# Patient Record
Sex: MALE | Race: WHITE | NOT HISPANIC OR LATINO | Employment: FULL TIME | URBAN - METROPOLITAN AREA
[De-identification: names, ages, dates, MRNs, and addresses within clinical notes are randomized per-mention and may not be internally consistent; named-entity substitution may affect disease eponyms.]

---

## 2023-02-13 PROBLEM — K46.9 ABDOMINAL HERNIA: Status: ACTIVE | Noted: 2023-02-13

## 2023-02-13 PROBLEM — E66.9 DIABETES MELLITUS TYPE 2 IN OBESE: Status: ACTIVE | Noted: 2023-02-13

## 2023-02-13 PROBLEM — R03.0 WHITE COAT SYNDROME WITHOUT HYPERTENSION: Status: ACTIVE | Noted: 2023-02-13

## 2023-02-13 PROBLEM — M25.569 KNEE PAIN: Status: ACTIVE | Noted: 2023-02-13

## 2023-02-13 PROBLEM — Z22.321 MSSA (METHICILLIN-SUSCEPTIBLE STAPH AUREUS) CARRIER: Status: ACTIVE | Noted: 2023-02-13

## 2023-02-13 PROBLEM — E78.5 HLD (HYPERLIPIDEMIA): Status: ACTIVE | Noted: 2023-02-13

## 2023-02-13 PROBLEM — E11.69 DIABETES MELLITUS TYPE 2 IN OBESE: Status: ACTIVE | Noted: 2023-02-13

## 2023-02-13 PROBLEM — K40.90 LEFT INGUINAL HERNIA: Status: ACTIVE | Noted: 2023-02-13

## 2023-03-07 ENCOUNTER — TELEPHONE (OUTPATIENT)
Dept: PRIMARY CARE | Facility: CLINIC | Age: 73
End: 2023-03-07
Payer: MEDICARE

## 2023-03-07 DIAGNOSIS — E78.5 HYPERLIPIDEMIA, UNSPECIFIED HYPERLIPIDEMIA TYPE: ICD-10-CM

## 2023-03-07 DIAGNOSIS — E66.9 DIABETES MELLITUS TYPE 2 IN OBESE: Primary | ICD-10-CM

## 2023-03-07 DIAGNOSIS — E11.69 DIABETES MELLITUS TYPE 2 IN OBESE: Primary | ICD-10-CM

## 2023-03-18 ENCOUNTER — TELEPHONE (OUTPATIENT)
Dept: PRIMARY CARE | Facility: CLINIC | Age: 73
End: 2023-03-18
Payer: MEDICARE

## 2023-03-18 DIAGNOSIS — E66.9 DIABETES MELLITUS TYPE 2 IN OBESE: Primary | ICD-10-CM

## 2023-03-18 DIAGNOSIS — E78.49 OTHER HYPERLIPIDEMIA: ICD-10-CM

## 2023-03-18 DIAGNOSIS — E11.69 DIABETES MELLITUS TYPE 2 IN OBESE: Primary | ICD-10-CM

## 2023-03-18 RX ORDER — GLIPIZIDE 5 MG/1
5 TABLET ORAL
Qty: 90 TABLET | Refills: 3 | Status: SHIPPED | OUTPATIENT
Start: 2023-03-18 | End: 2023-04-03 | Stop reason: SDUPTHER

## 2023-03-18 NOTE — TELEPHONE ENCOUNTER
----- Message from Andres Forde MA sent at 3/17/2023 11:24 AM EDT -----  Regarding: FW: Med Refill    ----- Message -----  From: Osmar Garcia  Sent: 3/17/2023  11:09 AM EDT  To: Do Koehler [address] [place] [address] Clinical Support Staff  Subject: Med Refill                                       Patient called to request a refill on Glipizide 5mg sent to pharmacy on file.

## 2023-03-20 ENCOUNTER — LAB (OUTPATIENT)
Dept: LAB | Facility: LAB | Age: 73
End: 2023-03-20
Payer: COMMERCIAL

## 2023-03-20 DIAGNOSIS — E78.49 OTHER HYPERLIPIDEMIA: ICD-10-CM

## 2023-03-20 DIAGNOSIS — E66.9 DIABETES MELLITUS TYPE 2 IN OBESE: ICD-10-CM

## 2023-03-20 DIAGNOSIS — E11.69 DIABETES MELLITUS TYPE 2 IN OBESE: ICD-10-CM

## 2023-03-20 LAB
ALANINE AMINOTRANSFERASE (SGPT) (U/L) IN SER/PLAS: 50 U/L (ref 10–52)
ALBUMIN (G/DL) IN SER/PLAS: 4.4 G/DL (ref 3.4–5)
ALKALINE PHOSPHATASE (U/L) IN SER/PLAS: 74 U/L (ref 33–136)
ANION GAP IN SER/PLAS: 15 MMOL/L (ref 10–20)
ASPARTATE AMINOTRANSFERASE (SGOT) (U/L) IN SER/PLAS: 25 U/L (ref 9–39)
BILIRUBIN TOTAL (MG/DL) IN SER/PLAS: 1.1 MG/DL (ref 0–1.2)
CALCIUM (MG/DL) IN SER/PLAS: 9.7 MG/DL (ref 8.6–10.6)
CARBON DIOXIDE, TOTAL (MMOL/L) IN SER/PLAS: 21 MMOL/L (ref 21–32)
CHLORIDE (MMOL/L) IN SER/PLAS: 104 MMOL/L (ref 98–107)
CHOLESTEROL (MG/DL) IN SER/PLAS: 113 MG/DL (ref 0–199)
CHOLESTEROL IN HDL (MG/DL) IN SER/PLAS: 29.1 MG/DL
CHOLESTEROL/HDL RATIO: 3.9
CREATININE (MG/DL) IN SER/PLAS: 0.83 MG/DL (ref 0.5–1.3)
ESTIMATED AVERAGE GLUCOSE FOR HBA1C: 194 MG/DL
GFR MALE: >90 ML/MIN/1.73M2
GLUCOSE (MG/DL) IN SER/PLAS: 254 MG/DL (ref 74–99)
HEMOGLOBIN A1C/HEMOGLOBIN TOTAL IN BLOOD: 8.4 %
LDL: 37 MG/DL (ref 0–99)
NON HDL CHOLESTEROL: 84 MG/DL
POTASSIUM (MMOL/L) IN SER/PLAS: 4.4 MMOL/L (ref 3.5–5.3)
PROTEIN TOTAL: 6.9 G/DL (ref 6.4–8.2)
SODIUM (MMOL/L) IN SER/PLAS: 136 MMOL/L (ref 136–145)
TRIGLYCERIDE (MG/DL) IN SER/PLAS: 235 MG/DL (ref 0–149)
UREA NITROGEN (MG/DL) IN SER/PLAS: 17 MG/DL (ref 6–23)
VLDL: 47 MG/DL (ref 0–40)

## 2023-03-20 PROCEDURE — 80053 COMPREHEN METABOLIC PANEL: CPT

## 2023-03-20 PROCEDURE — 80061 LIPID PANEL: CPT

## 2023-03-20 PROCEDURE — 83036 HEMOGLOBIN GLYCOSYLATED A1C: CPT

## 2023-03-20 PROCEDURE — 36415 COLL VENOUS BLD VENIPUNCTURE: CPT

## 2023-04-03 ENCOUNTER — OFFICE VISIT (OUTPATIENT)
Dept: PRIMARY CARE | Facility: CLINIC | Age: 73
End: 2023-04-03
Payer: COMMERCIAL

## 2023-04-03 VITALS
BODY MASS INDEX: 32.51 KG/M2 | HEART RATE: 102 BPM | WEIGHT: 232.2 LBS | DIASTOLIC BLOOD PRESSURE: 90 MMHG | TEMPERATURE: 97.1 F | SYSTOLIC BLOOD PRESSURE: 146 MMHG | HEIGHT: 71 IN | OXYGEN SATURATION: 94 %

## 2023-04-03 DIAGNOSIS — R03.0 WHITE COAT SYNDROME WITHOUT HYPERTENSION: ICD-10-CM

## 2023-04-03 DIAGNOSIS — E11.69 DIABETES MELLITUS TYPE 2 IN OBESE: Primary | ICD-10-CM

## 2023-04-03 DIAGNOSIS — K43.9 HERNIA OF ABDOMINAL WALL: Chronic | ICD-10-CM

## 2023-04-03 DIAGNOSIS — E66.9 DIABETES MELLITUS TYPE 2 IN OBESE: Primary | ICD-10-CM

## 2023-04-03 DIAGNOSIS — E78.2 MIXED HYPERLIPIDEMIA: ICD-10-CM

## 2023-04-03 DIAGNOSIS — Z23 NEED FOR SHINGLES VACCINE: ICD-10-CM

## 2023-04-03 LAB
ALBUMIN (MG/L) IN URINE: 7.3 MG/L
ALBUMIN/CREATININE (UG/MG) IN URINE: 5.6 UG/MG CRT (ref 0–30)
CREATININE (MG/DL) IN URINE: 130 MG/DL (ref 20–370)

## 2023-04-03 PROCEDURE — 99214 OFFICE O/P EST MOD 30 MIN: CPT | Performed by: FAMILY MEDICINE

## 2023-04-03 PROCEDURE — 1036F TOBACCO NON-USER: CPT | Performed by: FAMILY MEDICINE

## 2023-04-03 PROCEDURE — 1160F RVW MEDS BY RX/DR IN RCRD: CPT | Performed by: FAMILY MEDICINE

## 2023-04-03 PROCEDURE — 3077F SYST BP >= 140 MM HG: CPT | Performed by: FAMILY MEDICINE

## 2023-04-03 PROCEDURE — 3052F HG A1C>EQUAL 8.0%<EQUAL 9.0%: CPT | Performed by: FAMILY MEDICINE

## 2023-04-03 PROCEDURE — 82043 UR ALBUMIN QUANTITATIVE: CPT

## 2023-04-03 PROCEDURE — 82570 ASSAY OF URINE CREATININE: CPT

## 2023-04-03 PROCEDURE — 3080F DIAST BP >= 90 MM HG: CPT | Performed by: FAMILY MEDICINE

## 2023-04-03 PROCEDURE — 1159F MED LIST DOCD IN RCRD: CPT | Performed by: FAMILY MEDICINE

## 2023-04-03 RX ORDER — METFORMIN HYDROCHLORIDE 1000 MG/1
1000 TABLET ORAL
Qty: 180 TABLET | Refills: 3 | Status: SHIPPED | OUTPATIENT
Start: 2023-04-03 | End: 2024-04-02

## 2023-04-03 RX ORDER — ISOPROPYL ALCOHOL 70 ML/100ML
SWAB TOPICAL
COMMUNITY

## 2023-04-03 RX ORDER — GLIPIZIDE 5 MG/1
5 TABLET ORAL
Qty: 90 TABLET | Refills: 3 | Status: SHIPPED | OUTPATIENT
Start: 2023-04-03 | End: 2024-04-02

## 2023-04-03 RX ORDER — BLOOD-GLUCOSE CONTROL, NORMAL
EACH MISCELLANEOUS
COMMUNITY

## 2023-04-03 ASSESSMENT — PAIN SCALES - GENERAL: PAINLEVEL: 0-NO PAIN

## 2023-04-03 NOTE — PROGRESS NOTES
"  Subjective   Chief complaint: Jose Domingo is a 73 y.o. male who presents for Annual Exam (Pt is here for a physical.).    HPI:  History obtained from the patient.Patient is here today for Follow up DM,HTN.Patient reports that Blood sugar recording Run between [ 200-220].Occasional high recordings at [ 225].  No recent episodes of hypoglycemia.Medications have been well tolerated,No reportable side effects.Patient have been compliant with diet.  HTN: stable,tolerating meds well,No side effects,Denies chest pain,SOB,Palpitations,Headache and blurry vision.Patient also denies Leg swelling and LIMA.  Has been doing fairly well, have gained weight over the last 2-3 month after his knee surgery.  Plan to be more active during summer.      Ros:  Patient denies  Shortness of breath , chest pain  Nausea vomiting and diarrhea  No fever or chills  No dysuria  No hearing or vision changes  No skin lesions.    Objective   /90 (BP Location: Left arm, Patient Position: Sitting, BP Cuff Size: Large adult)   Pulse 102   Temp 36.2 °C (97.1 °F) (Temporal)   Ht 1.803 m (5' 11\")   Wt 105 kg (232 lb 3.2 oz)   SpO2 94%   BMI 32.39 kg/m²       General appearance: alert and oriented, in no acute distress  Eyes: conjunctivae/corneas clear. PERRL, EOM's intact.   Neck: no adenopathy, no carotid bruit, supple, symmetrical, trachea midline, and thyroid not enlarged, symmetric, no tenderness/mass/nodules  Lungs: clear to auscultation bilaterally  Chest wall: no tenderness  Heart: regular rate and rhythm, S1, S2 normal, no murmur, click, rub or gallop  Abdomen: soft, non-tender; bowel sounds normal; no masses, 2 hernias, inguinal and paraumblical.  Skin: Skin color, texture, turgor normal. No rashes or lesions  Neurologic: Motor and sensory Grossly normal        I have reviewed and reconciled the medication list with the patient today.   Current Outpatient Medications:     alcohol swabs pads, medicated, 70% pad; use as directed, " Disp: , Rfl:     atorvastatin (Lipitor) 40 mg tablet, Take 1 tablet (40 mg) by mouth once daily at bedtime., Disp: , Rfl:     blood sugar diagnostic (Advanced Gluc Meter Test Strip) strip, in the morning., Disp: , Rfl:     blood sugar diagnostic strip, Use as directed once daily and PRN, Disp: , Rfl:     blood-glucose control, normal (GLUCOSE CONTROL MISC), Use as directed, Disp: , Rfl:     glipiZIDE (Glucotrol) 5 mg tablet, Take 1 tablet (5 mg) by mouth once daily., Disp: 90 tablet, Rfl: 3    lancets 30 gauge misc, Use as directed, Disp: , Rfl:     metFORMIN (Glucophage) 1,000 mg tablet, Take 1 tablet (1,000 mg) by mouth in the morning and 1 tablet (1,000 mg) in the evening., Disp: , Rfl:     OneTouch Ultra Test strip, in the morning., Disp: , Rfl:     SITagliptin phosphate (Januvia) 50 mg tablet, Take 1 tablet (50 mg) by mouth once daily., Disp: , Rfl:     empagliflozin (Jardiance) 25 mg, Take 1 tablet (25 mg) by mouth once daily., Disp: , Rfl:      Imaging:  No results found.     Labs reviewed:    Lab Results   Component Value Date    CHOL 113 03/20/2023    TRIG 235 (H) 03/20/2023    HDL 29.1 (A) 03/20/2023    ALT 50 03/20/2023    AST 25 03/20/2023     03/20/2023    K 4.4 03/20/2023     03/20/2023    CREATININE 0.83 03/20/2023    BUN 17 03/20/2023    CO2 21 03/20/2023    HGBA1C 8.4 (A) 03/20/2023         Assessment/Plan     Diagnoses and all orders for this visit:  Diabetes mellitus type 2 in obese (CMS/HCC): needs better control  -     SITagliptin phosphate (Januvia) 50 mg tablet; Take 2 tablets (100 mg) by mouth once daily.  -     Referral to Ophthalmology; Future  -     glipiZIDE (Glucotrol) 5 mg tablet; Take 1 tablet (5 mg) by mouth once daily.  -     metFORMIN (Glucophage) 1,000 mg tablet; Take 1 tablet (1,000 mg) by mouth in the morning and 1 tablet (1,000 mg) in the evening. Take with meals.  -     Albumin , Urine Random; Future  Mixed hyperlipidemia: continue lipitor  White coat syndrome  without hypertension: mildly elevated but reports normal recordings at home.  Hernia of abdominal wall  -     Referral to General Surgery; Future  Need for shingles vaccine  -     zoster vaccine-recombinant adjuvanted (Shingrix) 50 mcg/0.5 mL vaccine; Inject 0.5 mL into the shoulder, thigh, or buttocks 1 time for 1 dose.    Diagnosis and Management discussed with the patient.  Patient agreeable with plan.  Patient advised to Return to clinic with new or unresolved symptoms.  Artemio Ayala MD    This note was partially generated using the Dragon Voice Recognition System and there may be some incorrect words or wording, spelling and /or spelling errors, or punctuation errors that were not corrected prior to committing the note to the medical record.

## 2023-06-14 ENCOUNTER — TELEPHONE (OUTPATIENT)
Dept: PRIMARY CARE | Facility: CLINIC | Age: 73
End: 2023-06-14

## 2023-06-14 DIAGNOSIS — E11.69 DIABETES MELLITUS TYPE 2 IN OBESE: Primary | ICD-10-CM

## 2023-06-14 DIAGNOSIS — E66.9 DIABETES MELLITUS TYPE 2 IN OBESE: Primary | ICD-10-CM

## 2023-06-14 NOTE — TELEPHONE ENCOUNTER
PT is calling asking for an order of blood work (full panel) to be placed before his appointment.

## 2023-06-15 DIAGNOSIS — R79.89 ABNORMAL LFTS: Primary | ICD-10-CM

## 2023-06-24 ENCOUNTER — LAB (OUTPATIENT)
Dept: LAB | Facility: LAB | Age: 73
End: 2023-06-24
Payer: MEDICARE

## 2023-06-24 DIAGNOSIS — E11.69 DIABETES MELLITUS TYPE 2 IN OBESE: ICD-10-CM

## 2023-06-24 DIAGNOSIS — R79.89 ABNORMAL LFTS: ICD-10-CM

## 2023-06-24 DIAGNOSIS — E66.9 DIABETES MELLITUS TYPE 2 IN OBESE: ICD-10-CM

## 2023-06-24 PROCEDURE — 80053 COMPREHEN METABOLIC PANEL: CPT

## 2023-06-24 PROCEDURE — 82043 UR ALBUMIN QUANTITATIVE: CPT

## 2023-06-24 PROCEDURE — 36415 COLL VENOUS BLD VENIPUNCTURE: CPT

## 2023-06-24 PROCEDURE — 83036 HEMOGLOBIN GLYCOSYLATED A1C: CPT

## 2023-06-24 PROCEDURE — 82570 ASSAY OF URINE CREATININE: CPT

## 2023-06-26 LAB
ALANINE AMINOTRANSFERASE (SGPT) (U/L) IN SER/PLAS: 74 U/L (ref 10–52)
ALBUMIN (G/DL) IN SER/PLAS: 4.7 G/DL (ref 3.4–5)
ALBUMIN (MG/L) IN URINE: <7 MG/L
ALBUMIN/CREATININE (UG/MG) IN URINE: NORMAL UG/MG CRT (ref 0–30)
ALKALINE PHOSPHATASE (U/L) IN SER/PLAS: 73 U/L (ref 33–136)
ANION GAP IN SER/PLAS: 12 MMOL/L (ref 10–20)
ASPARTATE AMINOTRANSFERASE (SGOT) (U/L) IN SER/PLAS: 34 U/L (ref 9–39)
BILIRUBIN TOTAL (MG/DL) IN SER/PLAS: 1.2 MG/DL (ref 0–1.2)
CALCIUM (MG/DL) IN SER/PLAS: 10 MG/DL (ref 8.6–10.6)
CARBON DIOXIDE, TOTAL (MMOL/L) IN SER/PLAS: 24 MMOL/L (ref 21–32)
CHLORIDE (MMOL/L) IN SER/PLAS: 106 MMOL/L (ref 98–107)
CREATININE (MG/DL) IN SER/PLAS: 0.86 MG/DL (ref 0.5–1.3)
CREATININE (MG/DL) IN URINE: 120 MG/DL (ref 20–370)
ESTIMATED AVERAGE GLUCOSE FOR HBA1C: 189 MG/DL
GFR MALE: >90 ML/MIN/1.73M2
GLUCOSE (MG/DL) IN SER/PLAS: 123 MG/DL (ref 74–99)
HEMOGLOBIN A1C/HEMOGLOBIN TOTAL IN BLOOD: 8.2 %
POTASSIUM (MMOL/L) IN SER/PLAS: 4.1 MMOL/L (ref 3.5–5.3)
PROTEIN TOTAL: 7.4 G/DL (ref 6.4–8.2)
SODIUM (MMOL/L) IN SER/PLAS: 138 MMOL/L (ref 136–145)
UREA NITROGEN (MG/DL) IN SER/PLAS: 20 MG/DL (ref 6–23)

## 2023-06-26 ASSESSMENT — ENCOUNTER SYMPTOMS
SPEECH DIFFICULTY: 0
WEIGHT LOSS: 0
NERVOUS/ANXIOUS: 0
VISUAL CHANGE: 0
CONFUSION: 0
WEAKNESS: 0
POLYDIPSIA: 0
DIZZINESS: 0
SEIZURES: 0
TREMORS: 0
POLYPHAGIA: 0
BLURRED VISION: 0
FATIGUE: 0
HUNGER: 0
BLACKOUTS: 0
HEADACHES: 0

## 2023-07-03 ENCOUNTER — TELEMEDICINE (OUTPATIENT)
Dept: PRIMARY CARE | Facility: CLINIC | Age: 73
End: 2023-07-03
Payer: MEDICARE

## 2023-07-03 VITALS — BODY MASS INDEX: 31.8 KG/M2 | WEIGHT: 228 LBS

## 2023-07-03 DIAGNOSIS — E78.2 MIXED HYPERLIPIDEMIA: ICD-10-CM

## 2023-07-03 DIAGNOSIS — K40.90 LEFT INGUINAL HERNIA: ICD-10-CM

## 2023-07-03 DIAGNOSIS — E11.69 DIABETES MELLITUS TYPE 2 IN OBESE: Primary | ICD-10-CM

## 2023-07-03 DIAGNOSIS — E66.9 DIABETES MELLITUS TYPE 2 IN OBESE: Primary | ICD-10-CM

## 2023-07-03 PROCEDURE — 99213 OFFICE O/P EST LOW 20 MIN: CPT | Performed by: FAMILY MEDICINE

## 2023-07-03 NOTE — PROGRESS NOTES
Subjective   Chief complaint: Jose Domingo is a 73 y.o. male who presents for Follow-up (From the visit).    HPI:  History obtained from the patient.Patient is here today for Follow up DM,HTN.Patient reports that Blood sugar recording Run between [ 150-200].Occasional high recordings at [ > 200].  No recent episodes of hypoglycemia.Medications have been well tolerated,No reportable side effects.Patient have been compliant with diet.  Januvia is becoming expensive and is having difficulty get it due to cost.  Hernia: would like to have the repair, no obstuction, no pain.        Ros:  Patient denies  Shortness of breath , chest pain  Nausea vomiting and diarrhea  No fever or chills  No dysuria  No hearing or vision changes  No skin lesions.    Objective   There were no vitals taken for this visit.      PHYSICAL EXAMINATION:  GENERAL: Alert,In no apparent distress  HEENT: Normocephalic, atraumatic. Extraocular movements intact.  NECK: Supple.  CHEST: Non labored breathing  EXTREMITIES: NROM  NEUROLOGIC: Motor Functions Grossly intact    I have reviewed and reconciled the medication list with the patient today.   Current Outpatient Medications:     alcohol swabs pads, medicated, 70% pad; use as directed, Disp: , Rfl:     atorvastatin (Lipitor) 40 mg tablet, TAKE 1 TABLET BY MOUTH EVERYDAY AT BEDTIME, Disp: 90 tablet, Rfl: 1    blood sugar diagnostic (Advanced Gluc Meter Test Strip) strip, in the morning., Disp: , Rfl:     blood sugar diagnostic strip, Use as directed once daily and PRN, Disp: , Rfl:     blood-glucose control, normal (GLUCOSE CONTROL MISC), Use as directed, Disp: , Rfl:     glipiZIDE (Glucotrol) 5 mg tablet, Take 1 tablet (5 mg) by mouth once daily., Disp: 90 tablet, Rfl: 3    lancets 30 gauge misc, Use as directed, Disp: , Rfl:     metFORMIN (Glucophage) 1,000 mg tablet, Take 1 tablet (1,000 mg) by mouth in the morning and 1 tablet (1,000 mg) in the evening. Take with meals., Disp: 180 tablet, Rfl: 3    " OneTouch Ultra Test strip, in the morning., Disp: , Rfl:     SITagliptin phosphate (Januvia) 100 mg tablet, Take 1 tablet (100 mg) by mouth once daily., Disp: 90 tablet, Rfl: 3     Imaging:  No results found.     Labs reviewed:    Lab Results   Component Value Date    CHOL 113 03/20/2023    TRIG 235 (H) 03/20/2023    HDL 29.1 (A) 03/20/2023    ALT 74 (H) 06/24/2023    AST 34 06/24/2023     06/24/2023    K 4.1 06/24/2023     06/24/2023    CREATININE 0.86 06/24/2023    BUN 20 06/24/2023    CO2 24 06/24/2023    HGBA1C 8.2 (A) 06/24/2023     Lab Results   Component Value Date    CHOL 113 03/20/2023    CHOL 189 03/19/2022    CHOL 132 06/15/2020     Lab Results   Component Value Date    HDL 29.1 (A) 03/20/2023    HDL 31.9 (A) 03/19/2022    HDL 35.7 (A) 06/15/2020     No results found for: \"LDLCALC\"  Lab Results   Component Value Date    TRIG 235 (H) 03/20/2023    TRIG 430 (H) 03/19/2022    TRIG 206 (H) 06/15/2020         Assessment/Plan   Diagnoses and all orders for this visit:  Diabetes mellitus type 2 in obese (CMS/[facility])  -   Tral of Jardiance again, may need to switch from januvia, will refill as well and he will let me know about copayment.  F/Up ophthalmology.    empagliflozin (Jardiance) 25 mg; Take 1 tablet (25 mg) by mouth once daily.  -     SITagliptin phosphate (Januvia) 100 mg tablet; Take 1 tablet (100 mg) by mouth once daily.  Left inguinal hernia  -     Referral to General Surgery; Future  Mixed hyperlipidemia  Continue Lipitor, lifestyle modifications discussed.    Plan to switch Glipizide as well next visit.    Diagnosis and Management discussed with the patient.  Patient agreeable with plan.  Patient advised to Return to clinic with new or unresolved symptoms.  Artemio Ayala MD    This note was partially generated using the Dragon Voice Recognition System and there may be some incorrect words or wording, spelling and /or spelling errors, or punctuation errors that were not corrected prior to " committing the note to the medical record.      Answers submitted by the patient for this visit:  Diabetes Questionnaire (Submitted on 6/26/2023)  Chief Complaint: Diabetes problem  Diabetes type: type 2  MedicAlert ID: No  Disease duration: 4 Years  blurred vision: No  chest pain: No  fatigue: No  foot paresthesias: No  foot ulcerations: No  polydipsia: No  polyphagia: No  polyuria: No  visual change: No  weakness: No  weight loss: No  Symptom course: improving  confusion: No  dizziness: No  headaches: No  hunger: No  mood changes: No  nervous/anxious: No  pallor: No  seizures: No  sleepiness: No  speech difficulty: No  tremors: No  blackouts: No  hospitalization: No  nocturnal hypoglycemia: No  required assistance: No  required glucagon: No  CVA: No  heart disease: No  impotence: Yes  nephropathy: No  peripheral neuropathy: No  PVD: No  retinopathy: No  CAD risks: dyslipidemia, obesity  Current treatments: oral agent (triple therapy)  Treatment compliance: all of the time  Home blood tests: 1-2 x per day  Home urines: <1 x per month  Monitoring compliance: fair  breakfast time: 7-8 am  breakfast glucose level: 180-200  Weight trend: decreasing steadily  Current diet: generally healthy  Meal planning: avoidance of concentrated sweets  Exercise: intermittently  Dietitian visit: No  Eye exam current: No  Sees podiatrist: No

## 2023-07-03 NOTE — PROGRESS NOTES
Answers submitted by the patient for this visit:  Diabetes Questionnaire (Submitted on 6/26/2023)  Chief Complaint: Diabetes problem  Diabetes type: type 2  MedicAlert ID: No  Disease duration: 4 Years  blurred vision: No  chest pain: No  fatigue: No  foot paresthesias: No  foot ulcerations: No  polydipsia: No  polyphagia: No  polyuria: No  visual change: No  weakness: No  weight loss: No  Symptom course: improving  confusion: No  dizziness: No  headaches: No  hunger: No  mood changes: No  nervous/anxious: No  pallor: No  seizures: No  sleepiness: No  speech difficulty: No  tremors: No  blackouts: No  hospitalization: No  nocturnal hypoglycemia: No  required assistance: No  required glucagon: No  CVA: No  heart disease: No  impotence: Yes  nephropathy: No  peripheral neuropathy: No  PVD: No  retinopathy: No  CAD risks: dyslipidemia, obesity  Current treatments: oral agent (triple therapy)  Treatment compliance: all of the time  Home blood tests: 1-2 x per day  Home urines: <1 x per month  Monitoring compliance: fair  breakfast time: 7-8 am  breakfast glucose level: 180-200  Weight trend: decreasing steadily  Current diet: generally healthy  Meal planning: avoidance of concentrated sweets  Exercise: intermittently  Dietitian visit: No  Eye exam current: No  Sees podiatrist: No

## 2023-07-07 ENCOUNTER — APPOINTMENT (OUTPATIENT)
Dept: PRIMARY CARE | Facility: CLINIC | Age: 73
End: 2023-07-07
Payer: MEDICARE

## 2023-08-28 ENCOUNTER — TELEPHONE (OUTPATIENT)
Dept: PRIMARY CARE | Facility: CLINIC | Age: 73
End: 2023-08-28
Payer: MEDICARE

## 2023-09-05 ENCOUNTER — TELEPHONE (OUTPATIENT)
Dept: PRIMARY CARE | Facility: CLINIC | Age: 73
End: 2023-09-05
Payer: MEDICARE

## 2023-09-05 DIAGNOSIS — E11.69 DIABETES MELLITUS TYPE 2 IN OBESE: ICD-10-CM

## 2023-09-05 DIAGNOSIS — E66.9 DIABETES MELLITUS TYPE 2 IN OBESE: ICD-10-CM

## 2023-09-05 RX ORDER — EMPAGLIFLOZIN 25 MG/1
25 TABLET, FILM COATED ORAL DAILY
Qty: 30 TABLET | Refills: 1 | Status: SHIPPED | OUTPATIENT
Start: 2023-09-05 | End: 2023-09-21 | Stop reason: SDUPTHER

## 2023-09-05 NOTE — TELEPHONE ENCOUNTER
Patient called in to give feedback on Jardiance. Patient states that he is only experiencing excessive urination, but other than that, the blood sugar seems to be responding. Patient also states that he stopped taking Januvia. Patient is inquiring about an ophthalmologist referral.

## 2023-09-07 DIAGNOSIS — E11.69 DIABETES MELLITUS TYPE 2 IN OBESE: Primary | ICD-10-CM

## 2023-09-07 DIAGNOSIS — E66.9 DIABETES MELLITUS TYPE 2 IN OBESE: Primary | ICD-10-CM

## 2023-09-12 ENCOUNTER — TELEPHONE (OUTPATIENT)
Dept: PRIMARY CARE | Facility: CLINIC | Age: 73
End: 2023-09-12

## 2023-09-21 DIAGNOSIS — E11.69 DIABETES MELLITUS TYPE 2 IN OBESE: ICD-10-CM

## 2023-09-21 DIAGNOSIS — E66.9 DIABETES MELLITUS TYPE 2 IN OBESE: ICD-10-CM

## 2023-09-21 NOTE — TELEPHONE ENCOUNTER
Patient states that he needs his prescription of Jardiance resent as he did not pick it up in time and the prescription is

## 2023-10-02 ENCOUNTER — TELEPHONE (OUTPATIENT)
Dept: PRIMARY CARE | Facility: CLINIC | Age: 73
End: 2023-10-02

## 2023-10-02 NOTE — TELEPHONE ENCOUNTER
Rx Refill Request Telephone Encounter    Name:  Jose Domingo  :  [id number]  Medication Name:  One Touch Delica Glucose Monitor            Specific Pharmacy location:  [facility]  Date of last appointment:  4/3/2023  Date of next appointment:  10/16/2023  Best number to reach patient:  [phone]

## 2023-10-11 NOTE — TELEPHONE ENCOUNTER
PT is calling asking for a new Glucose meter for His One Touch Delica.  Says his is a little old and would like a new one.  Also is asking about some blood work to be placed as well before his appt.

## 2023-10-12 DIAGNOSIS — E66.9 DIABETES MELLITUS TYPE 2 IN OBESE: Primary | ICD-10-CM

## 2023-10-12 DIAGNOSIS — E11.69 DIABETES MELLITUS TYPE 2 IN OBESE: Primary | ICD-10-CM

## 2023-10-14 ENCOUNTER — LAB (OUTPATIENT)
Dept: LAB | Facility: LAB | Age: 73
End: 2023-10-14
Payer: MEDICARE

## 2023-10-14 DIAGNOSIS — E66.9 DIABETES MELLITUS TYPE 2 IN OBESE: ICD-10-CM

## 2023-10-14 DIAGNOSIS — E11.69 DIABETES MELLITUS TYPE 2 IN OBESE: ICD-10-CM

## 2023-10-14 LAB
EST. AVERAGE GLUCOSE BLD GHB EST-MCNC: 174 MG/DL
HBA1C MFR BLD: 7.7 %

## 2023-10-14 PROCEDURE — 36415 COLL VENOUS BLD VENIPUNCTURE: CPT

## 2023-10-14 PROCEDURE — 83036 HEMOGLOBIN GLYCOSYLATED A1C: CPT

## 2023-10-16 ENCOUNTER — OFFICE VISIT (OUTPATIENT)
Dept: PRIMARY CARE | Facility: CLINIC | Age: 73
End: 2023-10-16
Payer: MEDICARE

## 2023-10-16 VITALS
SYSTOLIC BLOOD PRESSURE: 125 MMHG | HEIGHT: 71 IN | TEMPERATURE: 97.8 F | DIASTOLIC BLOOD PRESSURE: 88 MMHG | HEART RATE: 91 BPM | OXYGEN SATURATION: 97 % | WEIGHT: 219 LBS | BODY MASS INDEX: 30.66 KG/M2

## 2023-10-16 DIAGNOSIS — E11.69 DIABETES MELLITUS TYPE 2 IN OBESE: ICD-10-CM

## 2023-10-16 DIAGNOSIS — E66.9 DIABETES MELLITUS TYPE 2 IN OBESE: ICD-10-CM

## 2023-10-16 DIAGNOSIS — E78.2 MIXED HYPERLIPIDEMIA: ICD-10-CM

## 2023-10-16 DIAGNOSIS — R82.90 FOUL SMELLING URINE: Primary | ICD-10-CM

## 2023-10-16 LAB
POC APPEARANCE, URINE: CLEAR
POC BILIRUBIN, URINE: NEGATIVE
POC BLOOD, URINE: NEGATIVE
POC COLOR, URINE: YELLOW
POC GLUCOSE, URINE: ABNORMAL MG/DL
POC KETONES, URINE: NEGATIVE MG/DL
POC LEUKOCYTES, URINE: NEGATIVE
POC NITRITE,URINE: POSITIVE
POC PH, URINE: 5.5 PH
POC PROTEIN, URINE: NEGATIVE MG/DL
POC SPECIFIC GRAVITY, URINE: 1.02
POC UROBILINOGEN, URINE: 0.2 EU/DL

## 2023-10-16 PROCEDURE — 1160F RVW MEDS BY RX/DR IN RCRD: CPT | Performed by: FAMILY MEDICINE

## 2023-10-16 PROCEDURE — 3074F SYST BP LT 130 MM HG: CPT | Performed by: FAMILY MEDICINE

## 2023-10-16 PROCEDURE — 81003 URINALYSIS AUTO W/O SCOPE: CPT | Performed by: FAMILY MEDICINE

## 2023-10-16 PROCEDURE — 1159F MED LIST DOCD IN RCRD: CPT | Performed by: FAMILY MEDICINE

## 2023-10-16 PROCEDURE — 3079F DIAST BP 80-89 MM HG: CPT | Performed by: FAMILY MEDICINE

## 2023-10-16 PROCEDURE — 1125F AMNT PAIN NOTED PAIN PRSNT: CPT | Performed by: FAMILY MEDICINE

## 2023-10-16 PROCEDURE — 1036F TOBACCO NON-USER: CPT | Performed by: FAMILY MEDICINE

## 2023-10-16 PROCEDURE — 99214 OFFICE O/P EST MOD 30 MIN: CPT | Performed by: FAMILY MEDICINE

## 2023-10-16 PROCEDURE — 3051F HG A1C>EQUAL 7.0%<8.0%: CPT | Performed by: FAMILY MEDICINE

## 2023-10-16 RX ORDER — SULFAMETHOXAZOLE AND TRIMETHOPRIM 800; 160 MG/1; MG/1
1 TABLET ORAL 2 TIMES DAILY
Qty: 20 TABLET | Refills: 0 | Status: SHIPPED | OUTPATIENT
Start: 2023-10-16 | End: 2023-10-26

## 2023-10-16 ASSESSMENT — ENCOUNTER SYMPTOMS
DEPRESSION: 0
OCCASIONAL FEELINGS OF UNSTEADINESS: 0
LOSS OF SENSATION IN FEET: 0

## 2023-10-16 ASSESSMENT — PAIN SCALES - GENERAL: PAINLEVEL: 2

## 2023-10-16 NOTE — PROGRESS NOTES
"  Subjective   Chief complaint: Jose Domingo is a 73 y.o. male who presents for Follow-up.    HPI:  History obtained from the patient.Patient is here today for Follow up DM,HTN.Patient reports that sugars are between 140-160.   No recent episodes of hypoglycemia.Medications have been well tolerated,No reportable side effects.Patient have been compliant with diet.  HTN: mild Denies chest pain,SOB,Palpitations,Headache and blurry vision.Patient also denies Leg swelling and LIMA.    Reports an event of itching in penis and urgency, that since resolved after taking clindamycin.    Foot: injured left toes while clipping nails, some bleeding and pain.    Ros:  Patient denies  Shortness of breath , chest pain  Nausea vomiting and diarrhea  No fever or chills  No dysuria  No hearing or vision changes  No skin lesions.    Objective   /88 (BP Location: Right arm, Patient Position: Lying, BP Cuff Size: Adult)   Pulse 91   Temp 36.6 °C (97.8 °F) (Temporal)   Ht 1.803 m (5' 11\")   Wt 99.3 kg (219 lb)   SpO2 97%   BMI 30.54 kg/m²       General appearance: alert and oriented, in no acute distress  Eyes: conjunctivae/corneas clear.  Neck: no adenopathy, no carotid bruit, supple, symmetrical, trachea midline,   Lungs: clear to auscultation bilaterally  Heart: regular rate and rhythm, S1, S2 normal, no murmur, click, rub or gallop  Abdomen: soft, non-tender; bowel sounds normal; no masses, no organomegaly  Neurologic: Motor and sensory Grossly normal    Foot: hematoma on tip of third toe, no signs of infection    I have reviewed and reconciled the medication list with the patient today.   Current Outpatient Medications:     alcohol swabs pads, medicated, 70% pad; use as directed, Disp: , Rfl:     atorvastatin (Lipitor) 40 mg tablet, TAKE 1 TABLET BY MOUTH EVERYDAY AT BEDTIME, Disp: 90 tablet, Rfl: 1    blood sugar diagnostic (Advanced Gluc Meter Test Strip) strip, in the morning., Disp: , Rfl:     blood sugar diagnostic " strip, Use as directed once daily and PRN, Disp: , Rfl:     blood-glucose control, normal (GLUCOSE CONTROL MISC), Use as directed, Disp: , Rfl:     empagliflozin (Jardiance) 25 mg, Take 1 tablet (25 mg) by mouth once daily., Disp: 30 tablet, Rfl: 1    glipiZIDE (Glucotrol) 5 mg tablet, Take 1 tablet (5 mg) by mouth once daily., Disp: 90 tablet, Rfl: 3    lancets 30 gauge misc, Use as directed, Disp: , Rfl:     metFORMIN (Glucophage) 1,000 mg tablet, Take 1 tablet (1,000 mg) by mouth in the morning and 1 tablet (1,000 mg) in the evening. Take with meals., Disp: 180 tablet, Rfl: 3    OneTouch Ultra Test strip, in the morning., Disp: , Rfl:      Imaging:  No results found.     Labs reviewed:    Lab Results   Component Value Date    CHOL 113 03/20/2023    TRIG 235 (H) 03/20/2023    HDL 29.1 (A) 03/20/2023    ALT 74 (H) 06/24/2023    AST 34 06/24/2023     06/24/2023    K 4.1 06/24/2023     06/24/2023    CREATININE 0.86 06/24/2023    BUN 20 06/24/2023    CO2 24 06/24/2023    HGBA1C 7.7 (H) 10/14/2023     Pain Management Panel           No data to display              Lab Results   Component Value Date    ALBUMIN 4.7 06/24/2023         Assessment/Plan     Diagnoses and all orders for this visit:  Foul smelling urine:   -     POCT UA Automated manually resulted  -     sulfamethoxazole-trimethoprim (Bactrim DS) 800-160 mg tablet; Take 1 tablet by mouth 2 times a day for 10 days.  -     Urine Culture; Future  Mixed hyperlipidemia:continue lipitor  Diabetes mellitus type 2 in obese (CMS/HCC): improved A1c.  -     empagliflozin (Jardiance) 25 mg; Take 1 tablet (25 mg) by mouth once daily.      Hernia: follow up Surgery.    Diagnosis and Management discussed with the patient.  Patient agreeable with plan.  Patient advised to Return to clinic with new or unresolved symptoms.  Artemio Ayala MD    This note was partially generated using the Dragon Voice Recognition System and there may be some incorrect words or wording,  spelling and /or spelling errors, or punctuation errors that were not corrected prior to committing the note to the medical record.

## 2023-10-19 DIAGNOSIS — E66.9 DIABETES MELLITUS TYPE 2 IN OBESE: Primary | ICD-10-CM

## 2023-10-19 DIAGNOSIS — E11.69 DIABETES MELLITUS TYPE 2 IN OBESE: Primary | ICD-10-CM

## 2023-10-19 RX ORDER — BLOOD SUGAR DIAGNOSTIC
1 STRIP MISCELLANEOUS 3 TIMES DAILY
Qty: 100 STRIP | Refills: 11 | Status: SHIPPED | OUTPATIENT
Start: 2023-10-19 | End: 2023-11-18

## 2023-10-19 RX ORDER — SENNOSIDES/DOCUSATE SODIUM 8.6MG-50MG
1 TABLET ORAL ONCE
Qty: 1 EACH | Refills: 0 | Status: SHIPPED | OUTPATIENT
Start: 2023-10-19 | End: 2024-02-27

## 2023-10-23 DIAGNOSIS — E11.69 DIABETES MELLITUS TYPE 2 IN OBESE: Primary | ICD-10-CM

## 2023-10-23 DIAGNOSIS — E66.9 DIABETES MELLITUS TYPE 2 IN OBESE: Primary | ICD-10-CM

## 2023-10-26 ENCOUNTER — TELEPHONE (OUTPATIENT)
Dept: PRIMARY CARE | Facility: CLINIC | Age: 73
End: 2023-10-26
Payer: MEDICARE

## 2023-10-26 DIAGNOSIS — E11.69 DIABETES MELLITUS TYPE 2 IN OBESE: Primary | ICD-10-CM

## 2023-10-26 DIAGNOSIS — E66.9 DIABETES MELLITUS TYPE 2 IN OBESE: Primary | ICD-10-CM

## 2023-10-26 NOTE — TELEPHONE ENCOUNTER
Patient states that he keeps getting a prescription for test strips but he states it's the meter that he needs. Patient is asking for a prescription of a blood glucose meter and a lancet clemente

## 2023-10-30 ENCOUNTER — APPOINTMENT (OUTPATIENT)
Dept: SURGERY | Facility: CLINIC | Age: 73
End: 2023-10-30
Payer: MEDICARE

## 2023-10-30 DIAGNOSIS — E66.9 DIABETES MELLITUS TYPE 2 IN OBESE: ICD-10-CM

## 2023-10-30 DIAGNOSIS — E11.69 DIABETES MELLITUS TYPE 2 IN OBESE: ICD-10-CM

## 2023-10-30 RX ORDER — ATORVASTATIN CALCIUM 40 MG/1
TABLET, FILM COATED ORAL
Qty: 90 TABLET | Refills: 1 | Status: SHIPPED | OUTPATIENT
Start: 2023-10-30 | End: 2024-05-15 | Stop reason: SDUPTHER

## 2023-11-01 DIAGNOSIS — E66.9 DIABETES MELLITUS TYPE 2 IN OBESE: Primary | ICD-10-CM

## 2023-11-01 DIAGNOSIS — E11.69 DIABETES MELLITUS TYPE 2 IN OBESE: Primary | ICD-10-CM

## 2023-11-01 PROBLEM — D49.2 NEOPLASM OF UNSPECIFIED BEHAVIOR OF BONE, SOFT TISSUE, AND SKIN: Status: ACTIVE | Noted: 2019-11-18

## 2023-11-01 PROBLEM — M00.9 SEPTIC ARTHRITIS (MULTI): Status: ACTIVE | Noted: 2021-08-06

## 2023-11-01 PROBLEM — M17.11 ARTHRITIS OF KNEE, RIGHT: Status: ACTIVE | Noted: 2021-06-13

## 2023-11-01 PROBLEM — E43 SEVERE PROTEIN-CALORIE MALNUTRITION (MULTI): Status: ACTIVE | Noted: 2021-06-15

## 2023-11-01 PROBLEM — D48.5 NEOPLASM OF UNCERTAIN BEHAVIOR OF SKIN: Status: ACTIVE | Noted: 2019-11-18

## 2023-11-01 PROBLEM — R21 RASH AND OTHER NONSPECIFIC SKIN ERUPTION: Status: ACTIVE | Noted: 2019-11-18

## 2023-11-01 PROBLEM — D22.5 MELANOCYTIC NEVI OF TRUNK: Status: ACTIVE | Noted: 2019-11-18

## 2023-11-01 PROBLEM — E66.811 OBESITY, CLASS I, BMI 30-34.9: Status: ACTIVE | Noted: 2022-10-10

## 2023-11-01 PROBLEM — L91.8 OTHER HYPERTROPHIC DISORDERS OF THE SKIN: Status: ACTIVE | Noted: 2019-11-18

## 2023-11-01 PROBLEM — M00.061 STAPHYLOCOCCAL ARTHRITIS OF RIGHT KNEE (MULTI): Status: ACTIVE | Noted: 2021-07-01

## 2023-11-01 PROBLEM — L57.0 ACTINIC KERATOSIS: Status: ACTIVE | Noted: 2019-11-18

## 2023-11-01 PROBLEM — Z85.828 PERSONAL HISTORY OF OTHER MALIGNANT NEOPLASM OF SKIN: Status: ACTIVE | Noted: 2019-11-18

## 2023-11-01 PROBLEM — L82.1 OTHER SEBORRHEIC KERATOSIS: Status: ACTIVE | Noted: 2019-11-18

## 2023-11-01 PROBLEM — M17.9 DJD (DEGENERATIVE JOINT DISEASE) OF KNEE: Status: ACTIVE | Noted: 2022-10-10

## 2023-11-01 RX ORDER — DEXTROSE 4 G
TABLET,CHEWABLE ORAL
Qty: 1 EACH | Refills: 0 | Status: SHIPPED | OUTPATIENT
Start: 2023-11-01

## 2023-11-01 RX ORDER — LANCETS 26 GAUGE
EACH MISCELLANEOUS
Qty: 1 EACH | Refills: 1 | Status: SHIPPED | OUTPATIENT
Start: 2023-11-01 | End: 2024-10-31

## 2023-11-16 DIAGNOSIS — E11.69 DIABETES MELLITUS TYPE 2 IN OBESE: ICD-10-CM

## 2023-11-16 DIAGNOSIS — E66.9 DIABETES MELLITUS TYPE 2 IN OBESE: ICD-10-CM

## 2023-11-21 ENCOUNTER — DOCUMENTATION (OUTPATIENT)
Dept: PRIMARY CARE | Facility: CLINIC | Age: 73
End: 2023-11-21
Payer: MEDICARE

## 2023-11-21 RX ORDER — EMPAGLIFLOZIN 25 MG/1
25 TABLET, FILM COATED ORAL DAILY
Qty: 30 TABLET | Refills: 1 | OUTPATIENT
Start: 2023-11-21

## 2023-11-21 NOTE — PROGRESS NOTES
WE received a refill request for empaglifozin (jardiance) from a pharmacy in [place], [place].   Said refilled 2 months worth of this medication in mid-October 2023.  It was listed on MR. Domingo's allergy list, however.    Today I spoke with Mr. Domingo.  When he first took Jardiance, he got an upset stomach.    Months later, he tolerated it well and has had no sign of an allergic reaction in the past 1-2 months of treatment.  He is no longer taking januvia but notes his blood sugars have improved on jardiance.      Because of insurance issues, he has obtained his supply of this med. Elsewhere.  I refused the pharmacy request from [place] and removed this from his list of drug allergies.     He will be having a diabetes followup in early 2024.

## 2023-12-11 ENCOUNTER — APPOINTMENT (OUTPATIENT)
Dept: SURGERY | Facility: CLINIC | Age: 73
End: 2023-12-11
Payer: MEDICARE

## 2023-12-11 ENCOUNTER — OFFICE VISIT (OUTPATIENT)
Dept: DERMATOLOGY | Facility: CLINIC | Age: 73
End: 2023-12-11
Payer: MEDICARE

## 2023-12-11 DIAGNOSIS — D48.5 NEOPLASM OF UNCERTAIN BEHAVIOR OF SKIN: ICD-10-CM

## 2023-12-11 DIAGNOSIS — D22.9 MULTIPLE BENIGN NEVI: ICD-10-CM

## 2023-12-11 DIAGNOSIS — L82.1 SEBORRHEIC KERATOSIS: Primary | ICD-10-CM

## 2023-12-11 PROCEDURE — 88305 TISSUE EXAM BY PATHOLOGIST: CPT | Performed by: DERMATOLOGY

## 2023-12-11 PROCEDURE — 1036F TOBACCO NON-USER: CPT | Performed by: STUDENT IN AN ORGANIZED HEALTH CARE EDUCATION/TRAINING PROGRAM

## 2023-12-11 PROCEDURE — 88305 TISSUE EXAM BY PATHOLOGIST: CPT | Mod: TC,DER | Performed by: STUDENT IN AN ORGANIZED HEALTH CARE EDUCATION/TRAINING PROGRAM

## 2023-12-11 PROCEDURE — 99203 OFFICE O/P NEW LOW 30 MIN: CPT | Performed by: STUDENT IN AN ORGANIZED HEALTH CARE EDUCATION/TRAINING PROGRAM

## 2023-12-11 PROCEDURE — 3051F HG A1C>EQUAL 7.0%<8.0%: CPT | Performed by: STUDENT IN AN ORGANIZED HEALTH CARE EDUCATION/TRAINING PROGRAM

## 2023-12-11 PROCEDURE — 1159F MED LIST DOCD IN RCRD: CPT | Performed by: STUDENT IN AN ORGANIZED HEALTH CARE EDUCATION/TRAINING PROGRAM

## 2023-12-11 PROCEDURE — 1160F RVW MEDS BY RX/DR IN RCRD: CPT | Performed by: STUDENT IN AN ORGANIZED HEALTH CARE EDUCATION/TRAINING PROGRAM

## 2023-12-11 PROCEDURE — 1125F AMNT PAIN NOTED PAIN PRSNT: CPT | Performed by: STUDENT IN AN ORGANIZED HEALTH CARE EDUCATION/TRAINING PROGRAM

## 2023-12-11 PROCEDURE — 17110 DESTRUCTION B9 LES UP TO 14: CPT | Performed by: STUDENT IN AN ORGANIZED HEALTH CARE EDUCATION/TRAINING PROGRAM

## 2023-12-11 PROCEDURE — 11102 TANGNTL BX SKIN SINGLE LES: CPT | Performed by: STUDENT IN AN ORGANIZED HEALTH CARE EDUCATION/TRAINING PROGRAM

## 2023-12-11 NOTE — PROGRESS NOTES
Subjective     Jose Domingo is a 73 y.o. male who presents for the following: Suspicious Skin Lesion (Areas of concern Right temple, left knee, and right calf).     Review of Systems:  No other skin or systemic complaints other than what is documented elsewhere in the note.    The following portions of the chart were reviewed this encounter and updated as appropriate:          Skin Cancer History  No skin cancer on file.      Specialty Problems          Dermatology Problems    Actinic keratosis    Melanocytic nevi of trunk    Neoplasm of uncertain behavior of skin    Other hypertrophic disorders of the skin    Other seborrheic keratosis    Personal history of other malignant neoplasm of skin    Rash and other nonspecific skin eruption        Objective   Well appearing patient in no apparent distress; mood and affect are within normal limits.    A focused skin examination was performed. All findings within normal limits unless otherwise noted below.    Assessment/Plan   1. Seborrheic keratosis (2)  Right Temple (2)  Stuck on verrucous, tan-brown papules and plaques.      Cryotherapy, skin lesion - Right Temple (2)    2. Neoplasm of uncertain behavior of skin  Left Thigh - Anterior  reddishmacule          Lesion biopsy  Type of biopsy: tangential    Informed consent: discussed and consent obtained    Timeout: patient name, date of birth, surgical site, and procedure verified    Procedure prep:  Patient was prepped and draped  Anesthesia: the lesion was anesthetized in a standard fashion    Anesthetic:  1% lidocaine w/ epinephrine 1-100,000 local infiltration  Instrument used: DermaBlade    Hemostasis achieved with: aluminum chloride    Outcome: patient tolerated procedure well    Post-procedure details: sterile dressing applied and wound care instructions given    Dressing type: petrolatum and bandage      Specimen 1 - Dermatopathology- DERM LAB  Differential Diagnosis: sccis  Check Margins Yes/No?:    Comments:     Dermpath Lab: Routine Histopathology (formalin-fixed tissue)    3. Multiple benign nevi    Exam findings: Benign appearing macules and papules  Plan: monitor for any new or changing nevi. Notify me should this occur.  Over the counter use of sun screen product (30+ SPF with mineral sun screen) recommended

## 2023-12-13 LAB
LABORATORY COMMENT REPORT: NORMAL
PATH REPORT.FINAL DX SPEC: NORMAL
PATH REPORT.GROSS SPEC: NORMAL
PATH REPORT.MICROSCOPIC SPEC OTHER STN: NORMAL
PATH REPORT.RELEVANT HX SPEC: NORMAL
PATH REPORT.TOTAL CANCER: NORMAL

## 2023-12-21 DIAGNOSIS — E11.69 DIABETES MELLITUS TYPE 2 IN OBESE: Primary | ICD-10-CM

## 2023-12-21 DIAGNOSIS — E66.9 DIABETES MELLITUS TYPE 2 IN OBESE: Primary | ICD-10-CM

## 2024-01-08 ENCOUNTER — APPOINTMENT (OUTPATIENT)
Dept: SURGERY | Facility: CLINIC | Age: 74
End: 2024-01-08
Payer: MEDICARE

## 2024-01-29 ENCOUNTER — OFFICE VISIT (OUTPATIENT)
Dept: SURGERY | Facility: CLINIC | Age: 74
End: 2024-01-29
Payer: MEDICARE

## 2024-01-29 ENCOUNTER — OFFICE VISIT (OUTPATIENT)
Dept: DERMATOLOGY | Facility: CLINIC | Age: 74
End: 2024-01-29
Payer: MEDICARE

## 2024-01-29 ENCOUNTER — DOCUMENTATION (OUTPATIENT)
Dept: SURGERY | Facility: HOSPITAL | Age: 74
End: 2024-01-29

## 2024-01-29 ENCOUNTER — APPOINTMENT (OUTPATIENT)
Dept: SURGERY | Facility: CLINIC | Age: 74
End: 2024-01-29
Payer: MEDICARE

## 2024-01-29 VITALS
DIASTOLIC BLOOD PRESSURE: 89 MMHG | HEART RATE: 81 BPM | TEMPERATURE: 97.8 F | SYSTOLIC BLOOD PRESSURE: 131 MMHG | BODY MASS INDEX: 31.24 KG/M2 | WEIGHT: 224 LBS

## 2024-01-29 DIAGNOSIS — K43.9 EPIGASTRIC HERNIA: ICD-10-CM

## 2024-01-29 DIAGNOSIS — K40.90 NON-RECURRENT UNILATERAL INGUINAL HERNIA WITHOUT OBSTRUCTION OR GANGRENE: ICD-10-CM

## 2024-01-29 DIAGNOSIS — L57.0 ACTINIC KERATOSIS: ICD-10-CM

## 2024-01-29 DIAGNOSIS — C44.719 BASAL CELL CARCINOMA (BCC) OF SKIN OF LEFT LOWER EXTREMITY INCLUDING HIP: Primary | ICD-10-CM

## 2024-01-29 DIAGNOSIS — K40.91 UNILATERAL RECURRENT INGUINAL HERNIA WITHOUT OBSTRUCTION OR GANGRENE: Primary | ICD-10-CM

## 2024-01-29 PROCEDURE — 1036F TOBACCO NON-USER: CPT | Performed by: SURGERY

## 2024-01-29 PROCEDURE — 1159F MED LIST DOCD IN RCRD: CPT | Performed by: STUDENT IN AN ORGANIZED HEALTH CARE EDUCATION/TRAINING PROGRAM

## 2024-01-29 PROCEDURE — 1159F MED LIST DOCD IN RCRD: CPT | Performed by: SURGERY

## 2024-01-29 PROCEDURE — 3075F SYST BP GE 130 - 139MM HG: CPT | Performed by: SURGERY

## 2024-01-29 PROCEDURE — 1125F AMNT PAIN NOTED PAIN PRSNT: CPT | Performed by: STUDENT IN AN ORGANIZED HEALTH CARE EDUCATION/TRAINING PROGRAM

## 2024-01-29 PROCEDURE — 3079F DIAST BP 80-89 MM HG: CPT | Performed by: SURGERY

## 2024-01-29 PROCEDURE — 99203 OFFICE O/P NEW LOW 30 MIN: CPT | Performed by: SURGERY

## 2024-01-29 PROCEDURE — 17000 DESTRUCT PREMALG LESION: CPT | Performed by: STUDENT IN AN ORGANIZED HEALTH CARE EDUCATION/TRAINING PROGRAM

## 2024-01-29 PROCEDURE — 1126F AMNT PAIN NOTED NONE PRSNT: CPT | Performed by: SURGERY

## 2024-01-29 PROCEDURE — 1036F TOBACCO NON-USER: CPT | Performed by: STUDENT IN AN ORGANIZED HEALTH CARE EDUCATION/TRAINING PROGRAM

## 2024-01-29 PROCEDURE — 99212 OFFICE O/P EST SF 10 MIN: CPT | Performed by: STUDENT IN AN ORGANIZED HEALTH CARE EDUCATION/TRAINING PROGRAM

## 2024-01-29 RX ORDER — ASPIRIN 81 MG/1
TABLET ORAL 2 TIMES WEEKLY
COMMUNITY

## 2024-01-29 ASSESSMENT — PAIN SCALES - GENERAL: PAINLEVEL: 0-NO PAIN

## 2024-01-29 NOTE — PROGRESS NOTES
Subjective     Jose Domingo is a 73 y.o. male who presents for the following: ED&C (Electrodesiccation & Curettage) (Left thigh).     Review of Systems:  No other skin or systemic complaints other than what is documented elsewhere in the note.    The following portions of the chart were reviewed this encounter and updated as appropriate:          Skin Cancer History  Biopsy Date Type Location Status   12/11/23 BCC Left Thigh - Anterior Follow-up Arranged       Specialty Problems          Dermatology Problems    Actinic keratosis    Melanocytic nevi of trunk    Neoplasm of uncertain behavior of skin    Other hypertrophic disorders of the skin    Other seborrheic keratosis    Personal history of other malignant neoplasm of skin    Rash and other nonspecific skin eruption        Objective   Well appearing patient in no apparent distress; mood and affect are within normal limits.    A focused skin examination was performed. All findings within normal limits unless otherwise noted below.    Assessment/Plan   1. Basal cell carcinoma (BCC) of skin of left lower extremity including hip  Left Thigh - Anterior  Well healed scar    Will monitor, not treating for now    2. Actinic keratosis  Left Zygomatic Area  Erythematous macules with gritty scale  More texture/more raised.    Sal re check at time of skin exam to make sure I dont want to do a biopsy    Destr of lesion - Left Zygomatic Area  Complexity: simple    Destruction method: cryotherapy    Informed consent: discussed and consent obtained    Lesion destroyed using liquid nitrogen: Yes    Cryotherapy cycles:  1  Outcome: patient tolerated procedure well with no complications    Post-procedure details: wound care instructions given        since lesion was likely clear after biopsy (on exam certainly looks that way) + the very low risk location,we will just monitor

## 2024-01-29 NOTE — PROGRESS NOTES
Subjective   Patient ID: Jose Domingo is a 73 y.o. male who presents for evaluation of left inguinal hernia.     HPI  73 year old very pleasant male with a history of HLD and Type 2 diabetes well-controlled on medication who presents for evaluation of a left inguinal hernia. Of note, patient was seen by our clinic in 2019 for a supraumbilical epigastric hernia and large left inguinal hernia. An inguinal hernia repair was offered to the patient at the time but he did not proceed with surgery due to the COVID-19 pandemic. Since then, neither his supraumbilical nor left inguinal hernia has changed in size. He denies new or worsening of symptoms. He denies pain, nausea, emesis, or changes in bowel habits.  Patient reports use of Cologuard as colon screening in the past.  No prior hernia repairs.    PMHx: as above  PSHx: knee arthroplasty, no history of abdominal surgery, no history of hernia surgery  Social Hx: denies smoking, alcohol use, or recreational drug use    Review of Systems  GENERAL: No weight loss, malaise or fevers  RESPIRATORY: Negative for dyspnea or shortness of breath  CARDIOVASCULAR: Negative for chest pain  GI: No nausea, vomiting, constipation or diarrhea   : No history of dysuria, frequency or incontinence  HEMATOLOGY/LYMPHOLOGY: Negative for prolonged bleeding, bruising easily  NEURO: No numbness or tingling of extremities    Objective   Physical Exam  Physical Exam  General: pleasant, no acute distress  CV: regular rate and rhythm  Pulm: non-labored breathing on room air, lungs CTAB  GI: abdomen soft, mildly distended, non-tender to palpation. There is a ~2.5cmx2.5cm supraumbilical hernia that is reducible and without overlying skin changes. There is also an obvious left-sided bulge in patient's groin that's also reducible. No overlying skin changes or features that suggest incarceration or strangulation.   : deferred  Skin: warm, dry  Extremities: palpable DP/PT bilaterally. BLE compartments  soft.   Psych: appropriate mood and affect     Assessment/Plan       73 year old male with a history of HLD and Type 2 diabetes who presents for evaluation of a left inguinal hernia.     Plan to schedule patient for open supraumbilical epigastric hernia repair and laparoscopic left inguinal hernia repair with mesh.  Risks, benefits, alternatives, and complications were explained at length including bleeding, infection, viscus injury, conversion, spermatic cord injury, nerve injury entrapment, urinary retention, and hernia recurrence.  He will be scheduled electively for open epigastric hernia repair and laparoscopic left inguinal hernia repair under general anesthesia.       Eleonora George MD 01/29/24 9:06 AM

## 2024-02-01 PROBLEM — K43.9 EPIGASTRIC HERNIA: Status: ACTIVE | Noted: 2024-01-29

## 2024-02-05 ENCOUNTER — OFFICE VISIT (OUTPATIENT)
Dept: OPHTHALMOLOGY | Facility: CLINIC | Age: 74
End: 2024-02-05
Payer: MEDICARE

## 2024-02-05 DIAGNOSIS — H52.203 ASTIGMATISM OF BOTH EYES WITH PRESBYOPIA: ICD-10-CM

## 2024-02-05 DIAGNOSIS — H25.13 NUCLEAR SCLEROSIS OF BOTH EYES: ICD-10-CM

## 2024-02-05 DIAGNOSIS — H52.4 ASTIGMATISM OF BOTH EYES WITH PRESBYOPIA: ICD-10-CM

## 2024-02-05 DIAGNOSIS — E11.9 DIABETES MELLITUS TYPE 2 WITHOUT RETINOPATHY (MULTI): Primary | ICD-10-CM

## 2024-02-05 PROCEDURE — 92004 COMPRE OPH EXAM NEW PT 1/>: CPT | Performed by: OPTOMETRIST

## 2024-02-05 PROCEDURE — 92015 DETERMINE REFRACTIVE STATE: CPT | Performed by: OPTOMETRIST

## 2024-02-05 ASSESSMENT — CONF VISUAL FIELD
METHOD: COUNTING FINGERS
OD_NORMAL: 1
OS_NORMAL: 1
OD_INFERIOR_TEMPORAL_RESTRICTION: 0
OD_SUPERIOR_TEMPORAL_RESTRICTION: 0
OS_SUPERIOR_NASAL_RESTRICTION: 0
OS_SUPERIOR_TEMPORAL_RESTRICTION: 0
OD_INFERIOR_NASAL_RESTRICTION: 0
OS_INFERIOR_NASAL_RESTRICTION: 0
OS_INFERIOR_TEMPORAL_RESTRICTION: 0
OD_SUPERIOR_NASAL_RESTRICTION: 0

## 2024-02-05 ASSESSMENT — VISUAL ACUITY
OS_PH_SC: 20/25+
OS_SC: 20/50+2
OD_SC: 20/70+1
OD_PH_SC: 20/25-2
METHOD: SNELLEN - LINEAR

## 2024-02-05 ASSESSMENT — TONOMETRY
IOP_METHOD: GOLDMANN APPLANATION
OD_IOP_MMHG: 20
OS_IOP_MMHG: 17

## 2024-02-05 ASSESSMENT — ENCOUNTER SYMPTOMS
ENDOCRINE NEGATIVE: 1
HEMATOLOGIC/LYMPHATIC NEGATIVE: 0
EYES NEGATIVE: 0
CONSTITUTIONAL NEGATIVE: 0
ALLERGIC/IMMUNOLOGIC NEGATIVE: 0
NEUROLOGICAL NEGATIVE: 0
PSYCHIATRIC NEGATIVE: 0
ENDOCRINE COMMENTS: TYPE II DM
CARDIOVASCULAR NEGATIVE: 0
RESPIRATORY NEGATIVE: 0
GASTROINTESTINAL NEGATIVE: 0
MUSCULOSKELETAL NEGATIVE: 0

## 2024-02-05 ASSESSMENT — REFRACTION_MANIFEST
OS_CYLINDER: -2.00
OS_SPHERE: +0.50
OS_ADD: +2.50
OS_CYLINDER: -2.00
OD_ADD: +2.50
OD_SPHERE: +1.50
OD_SPHERE: +1.75
OD_AXIS: 095
OD_CYLINDER: -2.25
METHOD_AUTOREFRACTION: 1
OD_CYLINDER: -2.00
OD_AXIS: 090
OS_SPHERE: +0.75
OS_AXIS: 070
OS_AXIS: 082

## 2024-02-05 ASSESSMENT — EXTERNAL EXAM - RIGHT EYE: OD_EXAM: NORMAL

## 2024-02-05 ASSESSMENT — SLIT LAMP EXAM - LIDS
COMMENTS: NORMAL
COMMENTS: NORMAL

## 2024-02-05 ASSESSMENT — EXTERNAL EXAM - LEFT EYE: OS_EXAM: NORMAL

## 2024-02-05 NOTE — PROGRESS NOTES
Diabetes mellitus (DM) Dx x 3-4 years. No retinopathy.   Last eye exam remote (30+ years ago).  VA corrects to 20/20-3 OD and 20/20 OS.  A spectacle prescription was dispensed to be used as needed. Recommend to use for driving. Can use OTC readers as before for near.   Nuclear sclerosis OU.  The patient was asked to return to our clinic in one year or sooner if ocular or vision changes occur.

## 2024-02-19 DIAGNOSIS — E11.69 DIABETES MELLITUS TYPE 2 IN OBESE: ICD-10-CM

## 2024-02-19 DIAGNOSIS — E66.9 DIABETES MELLITUS TYPE 2 IN OBESE: ICD-10-CM

## 2024-02-19 NOTE — TELEPHONE ENCOUNTER
Pt called requesting refill of Jardiance. Noted pt not seen in over 6 months. Call placed to encourage scheduling. No answer, voicemail left to return nurse call.

## 2024-02-26 DIAGNOSIS — E11.69 DIABETES MELLITUS TYPE 2 IN OBESE: ICD-10-CM

## 2024-02-26 DIAGNOSIS — E66.9 DIABETES MELLITUS TYPE 2 IN OBESE: ICD-10-CM

## 2024-02-27 RX ORDER — BLOOD SUGAR DIAGNOSTIC
STRIP MISCELLANEOUS
Qty: 50 STRIP | Refills: 11 | Status: SHIPPED | OUTPATIENT
Start: 2024-02-27

## 2024-03-01 ENCOUNTER — HOSPITAL ENCOUNTER (OUTPATIENT)
Facility: HOSPITAL | Age: 74
Setting detail: OUTPATIENT SURGERY
Discharge: HOME | End: 2024-03-01
Attending: SURGERY | Admitting: SURGERY
Payer: MEDICARE

## 2024-03-01 ENCOUNTER — ANESTHESIA EVENT (OUTPATIENT)
Dept: OPERATING ROOM | Facility: HOSPITAL | Age: 74
End: 2024-03-01
Payer: MEDICARE

## 2024-03-01 ENCOUNTER — ANESTHESIA (OUTPATIENT)
Dept: OPERATING ROOM | Facility: HOSPITAL | Age: 74
End: 2024-03-01
Payer: MEDICARE

## 2024-03-01 ENCOUNTER — PHARMACY VISIT (OUTPATIENT)
Dept: PHARMACY | Facility: CLINIC | Age: 74
End: 2024-03-01
Payer: COMMERCIAL

## 2024-03-01 VITALS
BODY MASS INDEX: 31.7 KG/M2 | SYSTOLIC BLOOD PRESSURE: 141 MMHG | RESPIRATION RATE: 15 BRPM | WEIGHT: 226.41 LBS | DIASTOLIC BLOOD PRESSURE: 81 MMHG | HEIGHT: 71 IN | HEART RATE: 77 BPM | OXYGEN SATURATION: 97 % | TEMPERATURE: 97.9 F

## 2024-03-01 DIAGNOSIS — K43.9 EPIGASTRIC HERNIA: ICD-10-CM

## 2024-03-01 DIAGNOSIS — K40.90 NON-RECURRENT UNILATERAL INGUINAL HERNIA WITHOUT OBSTRUCTION OR GANGRENE: ICD-10-CM

## 2024-03-01 LAB
GLUCOSE BLD MANUAL STRIP-MCNC: 208 MG/DL (ref 74–99)
GLUCOSE BLD MANUAL STRIP-MCNC: 212 MG/DL (ref 74–99)
POC FINGERSTICK BLOOD GLUCOSE: 212 MG/DL (ref 70–100)

## 2024-03-01 PROCEDURE — RXMED WILLOW AMBULATORY MEDICATION CHARGE

## 2024-03-01 PROCEDURE — 0751T DGTZ GLS MCRSCP SLD LEVEL II: CPT | Mod: TC,SUR | Performed by: SURGERY

## 2024-03-01 PROCEDURE — 2500000005 HC RX 250 GENERAL PHARMACY W/O HCPCS: Performed by: SURGERY

## 2024-03-01 PROCEDURE — A49650 PR LAP,INGUINAL HERNIA REPR,INITIAL: Performed by: ANESTHESIOLOGY

## 2024-03-01 PROCEDURE — 88302 TISSUE EXAM BY PATHOLOGIST: CPT | Performed by: PATHOLOGY

## 2024-03-01 PROCEDURE — 7100000009 HC PHASE TWO TIME - INITIAL BASE CHARGE: Performed by: SURGERY

## 2024-03-01 PROCEDURE — 3600000003 HC OR TIME - INITIAL BASE CHARGE - PROCEDURE LEVEL THREE: Performed by: SURGERY

## 2024-03-01 PROCEDURE — 49594 RPR AA HRN 1ST 3-10 NCR/STRN: CPT | Performed by: STUDENT IN AN ORGANIZED HEALTH CARE EDUCATION/TRAINING PROGRAM

## 2024-03-01 PROCEDURE — A4217 STERILE WATER/SALINE, 500 ML: HCPCS | Performed by: SURGERY

## 2024-03-01 PROCEDURE — 2780000003 HC OR 278 NO HCPCS: Performed by: SURGERY

## 2024-03-01 PROCEDURE — 7100000010 HC PHASE TWO TIME - EACH INCREMENTAL 1 MINUTE: Performed by: SURGERY

## 2024-03-01 PROCEDURE — 3700000002 HC GENERAL ANESTHESIA TIME - EACH INCREMENTAL 1 MINUTE: Performed by: SURGERY

## 2024-03-01 PROCEDURE — A49650 PR LAP,INGUINAL HERNIA REPR,INITIAL

## 2024-03-01 PROCEDURE — 82962 GLUCOSE BLOOD TEST: CPT | Performed by: SURGERY

## 2024-03-01 PROCEDURE — 49650 LAP ING HERNIA REPAIR INIT: CPT | Performed by: STUDENT IN AN ORGANIZED HEALTH CARE EDUCATION/TRAINING PROGRAM

## 2024-03-01 PROCEDURE — 3600000008 HC OR TIME - EACH INCREMENTAL 1 MINUTE - PROCEDURE LEVEL THREE: Performed by: SURGERY

## 2024-03-01 PROCEDURE — 3700000001 HC GENERAL ANESTHESIA TIME - INITIAL BASE CHARGE: Performed by: SURGERY

## 2024-03-01 PROCEDURE — 7100000002 HC RECOVERY ROOM TIME - EACH INCREMENTAL 1 MINUTE: Performed by: SURGERY

## 2024-03-01 PROCEDURE — 49650 LAP ING HERNIA REPAIR INIT: CPT | Performed by: SURGERY

## 2024-03-01 PROCEDURE — 99100 ANES PT EXTEME AGE<1 YR&>70: CPT | Performed by: ANESTHESIOLOGY

## 2024-03-01 PROCEDURE — 82947 ASSAY GLUCOSE BLOOD QUANT: CPT | Mod: 59

## 2024-03-01 PROCEDURE — 49594 RPR AA HRN 1ST 3-10 NCR/STRN: CPT | Performed by: SURGERY

## 2024-03-01 PROCEDURE — 2500000001 HC RX 250 WO HCPCS SELF ADMINISTERED DRUGS (ALT 637 FOR MEDICARE OP)

## 2024-03-01 PROCEDURE — 2500000004 HC RX 250 GENERAL PHARMACY W/ HCPCS (ALT 636 FOR OP/ED): Performed by: STUDENT IN AN ORGANIZED HEALTH CARE EDUCATION/TRAINING PROGRAM

## 2024-03-01 PROCEDURE — 2720000007 HC OR 272 NO HCPCS: Performed by: SURGERY

## 2024-03-01 PROCEDURE — C1781 MESH (IMPLANTABLE): HCPCS | Performed by: SURGERY

## 2024-03-01 PROCEDURE — 2500000004 HC RX 250 GENERAL PHARMACY W/ HCPCS (ALT 636 FOR OP/ED)

## 2024-03-01 PROCEDURE — 2500000004 HC RX 250 GENERAL PHARMACY W/ HCPCS (ALT 636 FOR OP/ED): Performed by: SURGERY

## 2024-03-01 PROCEDURE — 2500000005 HC RX 250 GENERAL PHARMACY W/O HCPCS

## 2024-03-01 PROCEDURE — 7100000001 HC RECOVERY ROOM TIME - INITIAL BASE CHARGE: Performed by: SURGERY

## 2024-03-01 DEVICE — BARD SOFT MESH
Type: IMPLANTABLE DEVICE | Site: ABDOMEN | Status: FUNCTIONAL
Brand: BARD SOFT MESH

## 2024-03-01 RX ORDER — OXYCODONE HYDROCHLORIDE 5 MG/1
10 TABLET ORAL EVERY 4 HOURS PRN
Status: CANCELLED | OUTPATIENT
Start: 2024-03-01

## 2024-03-01 RX ORDER — HYDROMORPHONE HYDROCHLORIDE 1 MG/ML
INJECTION, SOLUTION INTRAMUSCULAR; INTRAVENOUS; SUBCUTANEOUS AS NEEDED
Status: DISCONTINUED | OUTPATIENT
Start: 2024-03-01 | End: 2024-03-01

## 2024-03-01 RX ORDER — CEFAZOLIN 1 G/1
INJECTION, POWDER, FOR SOLUTION INTRAVENOUS AS NEEDED
Status: DISCONTINUED | OUTPATIENT
Start: 2024-03-01 | End: 2024-03-01

## 2024-03-01 RX ORDER — LIDOCAINE HYDROCHLORIDE 10 MG/ML
INJECTION INFILTRATION; PERINEURAL AS NEEDED
Status: DISCONTINUED | OUTPATIENT
Start: 2024-03-01 | End: 2024-03-01 | Stop reason: HOSPADM

## 2024-03-01 RX ORDER — BUPIVACAINE HYDROCHLORIDE 5 MG/ML
INJECTION, SOLUTION PERINEURAL AS NEEDED
Status: DISCONTINUED | OUTPATIENT
Start: 2024-03-01 | End: 2024-03-01 | Stop reason: HOSPADM

## 2024-03-01 RX ORDER — LABETALOL HYDROCHLORIDE 5 MG/ML
5 INJECTION, SOLUTION INTRAVENOUS ONCE AS NEEDED
Status: CANCELLED | OUTPATIENT
Start: 2024-03-01

## 2024-03-01 RX ORDER — FENTANYL CITRATE 50 UG/ML
INJECTION, SOLUTION INTRAMUSCULAR; INTRAVENOUS AS NEEDED
Status: DISCONTINUED | OUTPATIENT
Start: 2024-03-01 | End: 2024-03-01

## 2024-03-01 RX ORDER — SODIUM CHLORIDE, SODIUM LACTATE, POTASSIUM CHLORIDE, CALCIUM CHLORIDE 600; 310; 30; 20 MG/100ML; MG/100ML; MG/100ML; MG/100ML
100 INJECTION, SOLUTION INTRAVENOUS CONTINUOUS
Status: CANCELLED | OUTPATIENT
Start: 2024-03-01

## 2024-03-01 RX ORDER — LIDOCAINE HYDROCHLORIDE 40 MG/ML
SOLUTION TOPICAL AS NEEDED
Status: DISCONTINUED | OUTPATIENT
Start: 2024-03-01 | End: 2024-03-01

## 2024-03-01 RX ORDER — ALBUTEROL SULFATE 0.83 MG/ML
2.5 SOLUTION RESPIRATORY (INHALATION) ONCE AS NEEDED
Status: CANCELLED | OUTPATIENT
Start: 2024-03-01

## 2024-03-01 RX ORDER — OXYCODONE HYDROCHLORIDE 5 MG/1
5 TABLET ORAL EVERY 4 HOURS PRN
Status: CANCELLED | OUTPATIENT
Start: 2024-03-01

## 2024-03-01 RX ORDER — SODIUM CHLORIDE 0.9 G/100ML
IRRIGANT IRRIGATION AS NEEDED
Status: DISCONTINUED | OUTPATIENT
Start: 2024-03-01 | End: 2024-03-01 | Stop reason: HOSPADM

## 2024-03-01 RX ORDER — GLYCOPYRROLATE 0.2 MG/ML
INJECTION INTRAMUSCULAR; INTRAVENOUS AS NEEDED
Status: DISCONTINUED | OUTPATIENT
Start: 2024-03-01 | End: 2024-03-01

## 2024-03-01 RX ORDER — PROPOFOL 10 MG/ML
INJECTION, EMULSION INTRAVENOUS AS NEEDED
Status: DISCONTINUED | OUTPATIENT
Start: 2024-03-01 | End: 2024-03-01

## 2024-03-01 RX ORDER — HYDROMORPHONE HYDROCHLORIDE 1 MG/ML
0.5 INJECTION, SOLUTION INTRAMUSCULAR; INTRAVENOUS; SUBCUTANEOUS EVERY 5 MIN PRN
Status: CANCELLED | OUTPATIENT
Start: 2024-03-01

## 2024-03-01 RX ORDER — PHENYLEPHRINE HCL IN 0.9% NACL 0.4MG/10ML
SYRINGE (ML) INTRAVENOUS AS NEEDED
Status: DISCONTINUED | OUTPATIENT
Start: 2024-03-01 | End: 2024-03-01

## 2024-03-01 RX ORDER — HYDRALAZINE HYDROCHLORIDE 20 MG/ML
5 INJECTION INTRAMUSCULAR; INTRAVENOUS EVERY 30 MIN PRN
Status: CANCELLED | OUTPATIENT
Start: 2024-03-01

## 2024-03-01 RX ORDER — ONDANSETRON HYDROCHLORIDE 2 MG/ML
INJECTION, SOLUTION INTRAVENOUS AS NEEDED
Status: DISCONTINUED | OUTPATIENT
Start: 2024-03-01 | End: 2024-03-01

## 2024-03-01 RX ORDER — DEXAMETHASONE SODIUM PHOSPHATE 4 MG/ML
INJECTION, SOLUTION INTRA-ARTICULAR; INTRALESIONAL; INTRAMUSCULAR; INTRAVENOUS; SOFT TISSUE AS NEEDED
Status: DISCONTINUED | OUTPATIENT
Start: 2024-03-01 | End: 2024-03-01

## 2024-03-01 RX ORDER — HEPARIN SODIUM 5000 [USP'U]/ML
5000 INJECTION, SOLUTION INTRAVENOUS; SUBCUTANEOUS ONCE
Status: COMPLETED | OUTPATIENT
Start: 2024-03-01 | End: 2024-03-01

## 2024-03-01 RX ORDER — SODIUM CHLORIDE, SODIUM LACTATE, POTASSIUM CHLORIDE, CALCIUM CHLORIDE 600; 310; 30; 20 MG/100ML; MG/100ML; MG/100ML; MG/100ML
INJECTION, SOLUTION INTRAVENOUS CONTINUOUS PRN
Status: DISCONTINUED | OUTPATIENT
Start: 2024-03-01 | End: 2024-03-01

## 2024-03-01 RX ORDER — ACETAMINOPHEN 325 MG/1
975 TABLET ORAL ONCE
Status: CANCELLED | OUTPATIENT
Start: 2024-03-01 | End: 2024-03-01

## 2024-03-01 RX ORDER — ROCURONIUM BROMIDE 10 MG/ML
INJECTION, SOLUTION INTRAVENOUS AS NEEDED
Status: DISCONTINUED | OUTPATIENT
Start: 2024-03-01 | End: 2024-03-01

## 2024-03-01 RX ORDER — HYDROMORPHONE HYDROCHLORIDE 1 MG/ML
0.2 INJECTION, SOLUTION INTRAMUSCULAR; INTRAVENOUS; SUBCUTANEOUS EVERY 5 MIN PRN
Status: CANCELLED | OUTPATIENT
Start: 2024-03-01

## 2024-03-01 RX ORDER — ONDANSETRON HYDROCHLORIDE 2 MG/ML
4 INJECTION, SOLUTION INTRAVENOUS ONCE AS NEEDED
Status: CANCELLED | OUTPATIENT
Start: 2024-03-01

## 2024-03-01 RX ORDER — OXYCODONE HYDROCHLORIDE 5 MG/1
5 TABLET ORAL EVERY 6 HOURS PRN
Qty: 10 TABLET | Refills: 0 | Status: SHIPPED | OUTPATIENT
Start: 2024-03-01

## 2024-03-01 RX ORDER — LIDOCAINE HYDROCHLORIDE 10 MG/ML
0.1 INJECTION, SOLUTION EPIDURAL; INFILTRATION; INTRACAUDAL; PERINEURAL ONCE
Status: CANCELLED | OUTPATIENT
Start: 2024-03-01 | End: 2024-03-01

## 2024-03-01 RX ADMIN — HEPARIN SODIUM 5000 UNITS: 5000 INJECTION INTRAVENOUS; SUBCUTANEOUS at 06:52

## 2024-03-01 RX ADMIN — PROPOFOL 150 MG: 10 INJECTION, EMULSION INTRAVENOUS at 07:29

## 2024-03-01 RX ADMIN — SUGAMMADEX 200 MG: 100 INJECTION, SOLUTION INTRAVENOUS at 09:29

## 2024-03-01 RX ADMIN — LIDOCAINE HYDROCHLORIDE 4 ML: 40 SOLUTION TOPICAL at 07:32

## 2024-03-01 RX ADMIN — GLYCOPYRROLATE 0.4 MG: 0.2 INJECTION, SOLUTION INTRAMUSCULAR; INTRAVENOUS at 07:56

## 2024-03-01 RX ADMIN — ROCURONIUM BROMIDE 50 MG: 10 INJECTION INTRAVENOUS at 07:30

## 2024-03-01 RX ADMIN — HYDROMORPHONE HYDROCHLORIDE 0.2 MG: 1 INJECTION, SOLUTION INTRAMUSCULAR; INTRAVENOUS; SUBCUTANEOUS at 09:35

## 2024-03-01 RX ADMIN — HYDROMORPHONE HYDROCHLORIDE 0.4 MG: 1 INJECTION, SOLUTION INTRAMUSCULAR; INTRAVENOUS; SUBCUTANEOUS at 07:51

## 2024-03-01 RX ADMIN — SODIUM CHLORIDE, POTASSIUM CHLORIDE, SODIUM LACTATE AND CALCIUM CHLORIDE: 600; 310; 30; 20 INJECTION, SOLUTION INTRAVENOUS at 07:22

## 2024-03-01 RX ADMIN — Medication 120 MCG: at 07:56

## 2024-03-01 RX ADMIN — CEFAZOLIN 2 G: 330 INJECTION, POWDER, FOR SOLUTION INTRAMUSCULAR; INTRAVENOUS at 07:37

## 2024-03-01 RX ADMIN — FENTANYL CITRATE 100 MCG: 50 INJECTION, SOLUTION INTRAMUSCULAR; INTRAVENOUS at 07:29

## 2024-03-01 RX ADMIN — ONDANSETRON 4 MG: 2 INJECTION INTRAMUSCULAR; INTRAVENOUS at 09:20

## 2024-03-01 RX ADMIN — DEXAMETHASONE SODIUM PHOSPHATE 8 MG: 4 INJECTION, SOLUTION INTRA-ARTICULAR; INTRALESIONAL; INTRAMUSCULAR; INTRAVENOUS; SOFT TISSUE at 07:35

## 2024-03-01 RX ADMIN — ROCURONIUM BROMIDE 10 MG: 10 INJECTION INTRAVENOUS at 08:25

## 2024-03-01 RX ADMIN — ROCURONIUM BROMIDE 10 MG: 10 INJECTION INTRAVENOUS at 08:50

## 2024-03-01 RX ADMIN — HYDROMORPHONE HYDROCHLORIDE 0.2 MG: 1 INJECTION, SOLUTION INTRAMUSCULAR; INTRAVENOUS; SUBCUTANEOUS at 09:18

## 2024-03-01 RX ADMIN — Medication 120 MCG: at 07:58

## 2024-03-01 ASSESSMENT — PAIN SCALES - GENERAL
PAINLEVEL_OUTOF10: 0 - NO PAIN
PAIN_LEVEL: 3
PAINLEVEL_OUTOF10: 0 - NO PAIN

## 2024-03-01 ASSESSMENT — PAIN - FUNCTIONAL ASSESSMENT
PAIN_FUNCTIONAL_ASSESSMENT: 0-10

## 2024-03-01 ASSESSMENT — COLUMBIA-SUICIDE SEVERITY RATING SCALE - C-SSRS
2. HAVE YOU ACTUALLY HAD ANY THOUGHTS OF KILLING YOURSELF?: NO
1. IN THE PAST MONTH, HAVE YOU WISHED YOU WERE DEAD OR WISHED YOU COULD GO TO SLEEP AND NOT WAKE UP?: NO
6. HAVE YOU EVER DONE ANYTHING, STARTED TO DO ANYTHING, OR PREPARED TO DO ANYTHING TO END YOUR LIFE?: NO

## 2024-03-01 NOTE — H&P
History Of Present Illness  73 year old very pleasant male with a history of HLD and Type 2 diabetes well-controlled on medication who presents for evaluation of a left inguinal hernia. Of note, patient was seen by our clinic in 2019 for a supraumbilical epigastric hernia and large left inguinal hernia. An inguinal hernia repair was offered to the patient at the time but he did not proceed with surgery due to the COVID-19 pandemic. Since then, neither his supraumbilical nor left inguinal hernia has changed in size. He denies new or worsening of symptoms. He denies pain, nausea, emesis, or changes in bowel habits.  Patient reports use of Cologuard as colon screening in the past.  No prior hernia repairs.      Past Medical History  Past Medical History:   Diagnosis Date    Corneal abrasion     50 years ago    Seng     has had 2-3 before       Surgical History  Past Surgical History:   Procedure Laterality Date    OTHER SURGICAL HISTORY  11/18/2019    No history of surgery        Social History  He reports that he has never smoked. He has never been exposed to tobacco smoke. He has never used smokeless tobacco. He reports current alcohol use. He reports that he does not use drugs.    Family History  Family History   Problem Relation Name Age of Onset    Macular degeneration Mother      Glaucoma Neg Hx      Diabetes Neg Hx          Allergies  Penicillins    Review of Systems  GENERAL: No weight loss, malaise or fevers  RESPIRATORY: Negative for dyspnea or shortness of breath  CARDIOVASCULAR: Negative for chest pain  GI: No nausea, vomiting, constipation or diarrhea   : No history of dysuria, frequency or incontinence  HEMATOLOGY/LYMPHOLOGY: Negative for prolonged bleeding, bruising easily  NEURO: No numbness or tingling of extremities     Physical Exam  General: pleasant, no acute distress  CV: regular rate and rhythm  Pulm: non-labored breathing on room air, lungs CTAB  GI: abdomen soft, mildly distended,  non-tender to palpation. There is a ~2.5cmx2.5cm supraumbilical hernia that is reducible and without overlying skin changes. There is also an obvious left-sided bulge in patient's groin that's also reducible. No overlying skin changes or features that suggest incarceration or strangulation.   : deferred  Skin: warm, dry  Extremities: palpable DP/PT bilaterally. BLE compartments soft.   Psych: appropriate mood and affect   Last Recorded Vitals  There were no vitals taken for this visit.    Assessment/Plan   Principal Problem:    Epigastric hernia  Active Problems:    Abdominal hernia      Jose Domingo is a 74 y.o. male with epigastric hernia and L inguinal hernia here for repair of both       Bartolome Anna MD

## 2024-03-01 NOTE — OP NOTE
Repair Epigastric Hernia, Repair Inguinal Hernia Laparoscopy (L) Operative Note     Date: 3/1/2024  OR Location: [facility] OR    Name: Jose Domingo, : 1950, Age: 74 y.o., MRN: [id number], Sex: male    Diagnosis  Pre-op Diagnosis     * Non-recurrent unilateral inguinal hernia without obstruction or gangrene [K40.90]     * Epigastric hernia [K43.9] Post-op diagnosis:  Incarcerated epigastric hernia  Incarcerated left inguinal hernia     Procedures  Repair incarcerated epigastric 3-10cm hernia    Surgeons      * Adan Ohara - Primary    Resident/Fellow/Other Assistant:  Surgeon(s) and Role:    Procedure Summary  Anesthesia: General  ASA: II  Anesthesia Staff: Anesthesiologist: Keven Kelley MD  CRNA: IRAM Carey-CRNA  Estimated Blood Loss: 15mL  Intra-op Medications: Administrations occurring from 0715 to 0845 on 24:  * No intraprocedure medications in log *           Anesthesia Record               Intraprocedure I/O Totals          Intake    lactated Ringer's 900.00 mL    Total Intake 900 mL       Output    Urine 465 mL    Est. Blood Loss 5 mL    Total Output 470 mL       Net    Net Volume 430 mL          Specimen:   ID Type Source Tests Collected by Time   1 : EPIGASTRIC HERNIA CONTENT Tissue HERNIA SAC SURGICAL PATHOLOGY EXAM Adan Ohara MD 3/1/2024 0752        Staff:   Circulator: Yaima Goss RN; Ted Su RN  Scrub Person: Justo Hopper RN; Dane Moreno RN         Drains and/or Catheters:   Urethral Catheter Non-latex 16 Fr. (Active)       Tourniquet Times:         Implants:  Implants       Type Name Action Serial No.      Surgical Mesh Sling Implant MESH, SOFTMESH 6 X 6 - [id number] Implanted      Surgical Mesh Sling Implant MESH, SOFTMESH 6 X 6 - [id number] Implanted               Findings: Incarcerated 3 cm epigastric hernia with omentum, large incarcerated left indirect inguinal hernia with extensive preperitoneal fat and sigmoid colon    Indications: Jose ROCK  Jose L is an 74 y.o. male who is having surgery for Non-recurrent unilateral inguinal hernia without obstruction or gangrene [K40.90]  Epigastric hernia [K43.9].  Patient is taken operative for elective repair of epigastric and inguinal hernias.    The patient was seen in the preoperative area. The risks, benefits, complications, treatment options, non-operative alternatives, expected recovery and outcomes were discussed with the patient. The possibilities of reaction to medication, pulmonary aspiration, injury to surrounding structures, bleeding, recurrent infection, the need for additional procedures, failure to diagnose a condition, and creating a complication requiring transfusion or operation were discussed with the patient. The patient concurred with the proposed plan, giving informed consent.  The site of surgery was properly noted/marked if necessary per policy. The patient has been actively warmed in preoperative area. Preoperative antibiotics have been ordered and given within 1 hours of incision. Venous thrombosis prophylaxis have been ordered including bilateral sequential compression devices and chemical prophylaxis    Procedure Details: Patient was taken to the operating placed operating table in supine position.  Following duction of general static patient was intubated endotracheally.  Santiago catheter and orogastric tube were placed.  Abdominal wall was clipped of hair and prepped and draped sterilely.  Midline supraumbilical incision was created over the subcutaneous fullness and dissected down to the area of the epigastric hernia sac.  This was dissected down to the level of the fascia and the hernia sac was entered.  Omentum was identified in the hernia sac and was ligated and the hernia sac and segment of omentum were removed.  The balloon port was then advanced at this site.  Patient was placed in Trendelenburg position.  Abdominal wall was insufflated to 15 mmHg.  5 Zhang ports were placed in  left and right lateral abdominal wall.  Evaluation of the inguinal floor showed extensive intra-abdominal fat deposition.  In the left inguinal region sigmoid colon was incarcerated in the area of the left inguinal hernia consistent with a sliding hernia.  Transverse.  Incision was created and preperitoneal space was entered.  Direct space was dissected and there is notes of direct space hernia.  The indirect space was fully dissected and a large segment of retroperitoneal fat and colon fully reduced out of the dilated internal ring and the spermatic cord was fully skeletonized.  A 4 x 6 and segment of soft mesh with a lateral slit for tails was advanced into the peritoneal cavity and one tail was brought pines medical structures along above the 2 tails of particular laterally.  These were fixed the anterior abdominal wall using a 5 Zhang Protek device.  Single tacks were placed medial lateral to the epigastric vessels.  A second piece of soft mesh measuring 4 x 6 inches was also advanced and covered over the newly created internal ring to cover both the direct indirect spaces.  This was affixed to the intra-abdominal wall again using the fiber Protek device.  The fat contents were placed over the mesh and the peritoneum was then reapproximated again using the fiber Protek device.  At completion of the procedure there is no evidence of bleeding or viscus bilious leakage.  Ports removed under direct visitation.  The supraumbilical epigastric hernia site was closed transversely with figure-of-eight interrupted #1 Prolene sutures.  There is no tension on the closure.  Half marker still.  Incisions are closed with 3-0 Vicryl suture and skin was closed at all sites with subicular 4-0 Vicryl suture.  Dermabond applied.  Instrument sponge affections 2.  Dr. Bartolome Anna served as first assistant as there is no skilled surgical resident available for this complex procedure.  Dr. Anna participated in the dissection and repair  of the epigastric hernia as well as the incarcerated left inguinal hernia.  Operative time was approximately 2 hours for this complex inguinal hernia repair.  Patient's body habitus and morbid obesity greatly contributed to the complexity of the surgery.  He was extubated in the operating  room and transported to recovery in stable condition.  Complications:  None; patient tolerated the procedure well.    Disposition: PACU - hemodynamically stable.  Condition: stable         Additional Details: none    Attending Attestation: I was present and scrubbed for the entire procedure.    Adan Ohara  Phone Number: [phone]

## 2024-03-01 NOTE — ANESTHESIA POSTPROCEDURE EVALUATION
Patient: Jose Domingo    Procedure Summary       Date: 03/01/24 Room / Location: [facility] OR 01 / [facility] [place]    Anesthesia Start: 0722 Anesthesia Stop: 0946    Procedures:       Repair Epigastric Hernia (Abdomen)      Repair Inguinal Hernia Laparoscopy (Left: Abdomen) Diagnosis:       Non-recurrent unilateral inguinal hernia without obstruction or gangrene      Epigastric hernia      (Non-recurrent unilateral inguinal hernia without obstruction or gangrene [K40.90])      (Epigastric hernia [K43.9])    Surgeons: Adan Ohara MD Responsible Provider: Keven Kelley MD    Anesthesia Type: general ASA Status: 2            Anesthesia Type: general    Vitals Value Taken Time   /76 03/01/24 1015   Temp 36.4 °C (97.5 °F) 03/01/24 0938   Pulse 71 03/01/24 1034   Resp 16 03/01/24 1015   SpO2 92 % 03/01/24 1034   Vitals shown include unvalidated device data.    Anesthesia Post Evaluation    Patient location during evaluation: PACU  Patient participation: complete - patient participated  Level of consciousness: awake and alert  Pain score: 3  Pain management: adequate  Multimodal analgesia pain management approach  Airway patency: patent  Cardiovascular status: acceptable  Respiratory status: acceptable  Hydration status: acceptable  Postoperative Nausea and Vomiting: none        There were no known notable events for this encounter.

## 2024-03-01 NOTE — PERIOPERATIVE NURSING NOTE
1200- bedside report to TAMARA Monsalve    1020- patient ride home notified by RN of patient readiness for discharge, ride home reports ETA [id number]

## 2024-03-01 NOTE — ANESTHESIA PREPROCEDURE EVALUATION
"Patient: Jose Domingo    Procedure Information       Anesthesia Start Date/Time: 03/01/24 0722    Procedures:       Repair Epigastric Hernia (Abdomen)      Repair Inguinal Hernia Laparoscopy (Left: Abdomen)    Location: [facility] OR 01 / [facility] OR    Surgeons: Adan Ohara MD     ALLERGIES:  Allergies   Allergen Reactions    Penicillins Unknown, Hives and Rash        MEDICAL HISTORY:  Past Medical History:   Diagnosis Date    Anxiety     Corneal abrasion     50 years ago    Diabetes mellitus (CMS/HCC)     Hordeolum     has had 2-3 before    Hypertension         Relevant Problems   Cardiovascular   (+) HLD (hyperlipidemia)   (+) White coat syndrome without hypertension      Endocrine   (+) Diabetes mellitus type 2 without retinopathy (CMS/HCC)   (+) Type 2 diabetes mellitus with obesity (CMS/HCC)      Musculoskeletal   (+) DJD (degenerative joint disease) of knee      Infectious Disease   (+) Septic arthritis (CMS/HCC)   (+) Staphylococcal arthritis of right knee (CMS/HCC)      Other   (+) Arthritis of knee, right   (+) Septic arthritis (CMS/HCC)   (+) Staphylococcal arthritis of right knee (CMS/HCC)        SURGICAL HISTORY:  Past Surgical History:   Procedure Laterality Date    CARDIAC CATHETERIZATION      CORONARY STENT PLACEMENT      JOINT REPLACEMENT      KNEE ARTHROPLASTY      OTHER SURGICAL HISTORY  11/18/2019    No history of surgery        VITALS:      3/1/2024     6:43 AM 1/29/2024     9:08 AM 10/16/2023     1:18 PM   Vitals   Systolic 138 131 125   Diastolic 94 89 88   Heart Rate 71 81 91   Temp 36.1 °C (97 °F) 36.6 °C (97.8 °F) 36.6 °C (97.8 °F)   Resp 14     Height (in) 1.8 m (5' 10.87\")  1.803 m (5' 11\")   Weight (lb) 226.41 224 219   BMI 31.7 kg/m2 31.24 kg/m2 30.54 kg/m2   BSA (m2) 2.27 m2 2.26 m2 2.23 m2   Visit Report  Report    Report Report       LABS:   [facility]   Lab Results   Component Value Date    GLUCOSE 123 (H) 06/24/2023    CALCIUM 10.0 06/24/2023     06/24/2023    K 4.1 " "06/24/2023    CO2 24 06/24/2023     06/24/2023    BUN 20 06/24/2023    CREATININE 0.86 06/24/2023   , LFT   Lab Results   Component Value Date    ALT 74 (H) 06/24/2023    AST 34 06/24/2023    ALKPHOS 73 06/24/2023    BILITOT 1.2 06/24/2023   , CBCNo results found for: \"WBC\", \"HGB\", \"HCT\", \"MCV\", \"PLT\"       , Coags No results found for: \"PROTIME\", \"INR\", \"APTT\"   , A1C   Lab Results   Component Value Date    HGBA1C 7.7 (H) 10/14/2023       IMAGES:  EKG        No results found for this or any previous visit (from the past 4464 hour(s)).   , ECHO       No results found for this or any previous visit from the past 730 days.    , [facility]IAC [facility]ATH      [facility], CXR     No results found for this or any previous visit from the past 730 days.    , CT Head/Neck    [facility], CT Chest      No results found for this or any previous visit from the past 730 days.   , CT Abdomin     No results found for this or any previous visit from the past 730 days.    SOCIAL:  Social History     Tobacco Use   Smoking Status Never    Passive exposure: Never   Smokeless Tobacco Never      Social History     Substance and Sexual Activity   Alcohol Use Yes    Comment: occassionally      Social History     Substance and Sexual Activity   Drug Use Never        NPO STATUS:  NPO/Void Status  Carbohydrate Drink Given Prior to Surgery? : N  Date of Last Liquid: 02/29/24  Time of Last Liquid: 2359  Date of Last Solid: 02/29/24  Time of Last Solid: 2359  Last Intake Type: Clear fluids  Time of Last Void: 0645        Clinical Areas Reviewed:   Tobacco  Allergies  Meds   Med Hx  Surg Hx   Fam Hx  Soc Hx      Physical Exam    Airway  Mallampati: II  TM distance: >3 FB  Neck ROM: full     Cardiovascular - normal exam     Dental    Pulmonary - normal exam     Abdominal - normal exam             Anesthesia Plan    History of general anesthesia?: yes  History of complications of general anesthesia?: no    ASA 2     general     intravenous induction "   Postoperative administration of opioids is intended.  Anesthetic plan and risks discussed with patient.  Use of blood products discussed with patient who.    Plan discussed with CAA.

## 2024-03-01 NOTE — ANESTHESIA PROCEDURE NOTES
Airway  Date/Time: 3/1/2024 7:32 AM  Urgency: elective    Airway not difficult    Staffing  Performed: CRNA   Authorized by: Keven Kelley MD    Performed by: IRAM Carey-MAYRA  Patient location during procedure: OR    Indications and Patient Condition  Indications for airway management: anesthesia  Spontaneous Ventilation: absent  Sedation level: deep  Preoxygenated: yes  Patient position: sniffing  Mask difficulty assessment: 1 - vent by mask  Planned trial extubation    Final Airway Details  Final airway type: endotracheal airway      Successful airway: ETT  Cuffed: yes   Successful intubation technique: direct laryngoscopy  Facilitating devices/methods: intubating stylet and anterior pressure/BURP  Blade: Karla  Blade size: #4  ETT size (mm): 7.5  Cormack-Lehane Classification: grade IIb - view of arytenoids or posterior of glottis only  Placement verified by: chest auscultation and capnometry   Measured from: teeth  ETT to teeth (cm): 22  Number of attempts at approach: 1    Additional Comments  Grade 2b view with BURP, lips and teeth in preop condition

## 2024-03-01 NOTE — DISCHARGE INSTRUCTIONS
Discharge instruction after your surgical procedure    Your skin has been closed with dissolvable suture and covered with glue  It is okay to shower but no soaking (swimming, bathtubs) until follow-up in clinic.  Take your postoperative pain medications as instructed, use Tylenol and Motrin as your primary pain medications and alternate these.  If still having breakthrough pain you can use the prescribed narcotic pain medication.  No weight restrictions, activity as tolerated.  No dietary restriction, advance your diet as tolerated.  Contact the clinic if you have a fever (over 101.5), purulent drainage from incisions, significant redness around the incision, sustained nausea or vomiting, or any other issues.  Follow-up in clinic in 2 weeks with Dr. Ohara

## 2024-03-04 ASSESSMENT — PAIN SCALES - GENERAL: PAINLEVEL_OUTOF10: 2

## 2024-03-05 LAB
LABORATORY COMMENT REPORT: NORMAL
PATH REPORT.FINAL DX SPEC: NORMAL
PATH REPORT.GROSS SPEC: NORMAL
PATH REPORT.RELEVANT HX SPEC: NORMAL
PATH REPORT.TOTAL CANCER: NORMAL
RESIDENT REVIEW: NORMAL

## 2024-03-18 ENCOUNTER — OFFICE VISIT (OUTPATIENT)
Dept: SURGERY | Facility: CLINIC | Age: 74
End: 2024-03-18
Payer: MEDICARE

## 2024-03-18 VITALS
DIASTOLIC BLOOD PRESSURE: 86 MMHG | TEMPERATURE: 96.9 F | SYSTOLIC BLOOD PRESSURE: 155 MMHG | WEIGHT: 222.5 LBS | BODY MASS INDEX: 31.15 KG/M2 | HEART RATE: 69 BPM

## 2024-03-18 DIAGNOSIS — K40.90 REDUCIBLE LEFT INGUINAL HERNIA: Primary | ICD-10-CM

## 2024-03-18 PROCEDURE — 1159F MED LIST DOCD IN RCRD: CPT | Performed by: SURGERY

## 2024-03-18 PROCEDURE — 3077F SYST BP >= 140 MM HG: CPT | Performed by: SURGERY

## 2024-03-18 PROCEDURE — 1126F AMNT PAIN NOTED NONE PRSNT: CPT | Performed by: SURGERY

## 2024-03-18 PROCEDURE — 1036F TOBACCO NON-USER: CPT | Performed by: SURGERY

## 2024-03-18 PROCEDURE — 1160F RVW MEDS BY RX/DR IN RCRD: CPT | Performed by: SURGERY

## 2024-03-18 PROCEDURE — 3079F DIAST BP 80-89 MM HG: CPT | Performed by: SURGERY

## 2024-03-18 PROCEDURE — 99024 POSTOP FOLLOW-UP VISIT: CPT | Performed by: SURGERY

## 2024-03-18 ASSESSMENT — PAIN SCALES - GENERAL: PAINLEVEL: 0-NO PAIN

## 2024-03-18 NOTE — PROGRESS NOTES
Patient ID: Jose Domingo is a 74 y.o. male who presents for follow up s/p inguinal hernia repair    Subjective  73 YO M presenting following NADINE L inguinal hernia and umbilical hernia repair. Reports his post-operative pain has resolved, no longer taking pain medication. Eating and drinking ok, normal bowel function.     Reports an area of firmness overlying the L inguinal canal and is concerned that this is a recurrent hernia. Denies any overlying skin changes or tenderness. No symptoms of bowel obstruction.     ROS: Negative, except as noted above    Objective:    /86   Pulse 69   Temp 36.1 °C (96.9 °F)   Wt 101 kg (222 lb 8 oz)   BMI 31.15 kg/m²     No results found for this or any previous visit (from the past 24 hour(s)).    Physical Exam:  Neurological: Awake, alert, in NAD  CV: RRR  Pulm: breathing well on room air. No conversational dyspnea  GI: soft, NT, ND. Incisions well-approximated, no surrounding erythema or drainage; healing ridge palpated at umbilicus.  : Normal male genitals; palpable area of induration at the left groin, non-tender; no bulging with valsalva. No overlying skin changes.  Skin: warm, dry  Extremities: no edema       Current Outpatient Medications on File Prior to Visit   Medication Sig    aspirin 81 mg EC tablet 2 times a week.    atorvastatin (Lipitor) 40 mg tablet TAKE 1 TABLET BY MOUTH EVERYDAY AT BEDTIME    Autolet lancing device Use as instructed with OnTouch Delica Lancets    blood sugar diagnostic (Accu-Chek Guide test strips) strip PLACE 1 EACH ON THE SKIN 1 TIME FOR 1 DOSE.    blood-glucose control, normal (GLUCOSE CONTROL MISC) Use as directed    blood-glucose meter misc Use as directed    empagliflozin (Jardiance) 25 mg Take 1 tablet (25 mg) by mouth once daily.    glipiZIDE (Glucotrol) 5 mg tablet Take 1 tablet (5 mg) by mouth once daily.    lancets 30 gauge misc Use as directed    metFORMIN (Glucophage) 1,000 mg tablet Take 1 tablet (1,000 mg) by mouth in  the morning and 1 tablet (1,000 mg) in the evening. Take with meals.    alcohol swabs pads, medicated 70% pad; use as directed    OneTouch Ultra Test strip Place 1 strip on the skin 3 times a day.    oxyCODONE (Roxicodone) 5 mg immediate release tablet Take 1 tablet (5 mg) by mouth every 6 hours if needed for severe pain (7 - 10). (Patient not taking: Reported on 3/18/2024)     No current facility-administered medications on file prior to visit.        Assessment and Plan:  73 YO M s/p TAP L inguinal hernia repair and umbilical hernia repair on 3/1.     Has concern for area of induration at the left groin. Non-tender, no overlying skin changes. Reassured patient that this likely corresponds to inflammatory changes after surgery and will likely resolve over the next several weeks.     Will plan to have him follow-up in clinic in 3 months for re-evaluation to rule out hernia recurrence.     Patient seen and evaluated. Discussed with Dr. Ohara.      Parish Canas MD  General Surgery PGY5

## 2024-05-15 DIAGNOSIS — E78.2 MIXED HYPERLIPIDEMIA: Primary | ICD-10-CM

## 2024-05-15 DIAGNOSIS — E11.69 TYPE 2 DIABETES MELLITUS WITH OBESITY (MULTI): ICD-10-CM

## 2024-05-15 DIAGNOSIS — E66.9 TYPE 2 DIABETES MELLITUS WITH OBESITY (MULTI): ICD-10-CM

## 2024-05-15 NOTE — TELEPHONE ENCOUNTER
Pt called requesting refill of atorvastatin. Noted pt not seen over 6 months, also in need to establish care with new provider. Call placed to pt to encourage scheduling. Pt stated he would contact his insurance to learn what options he has to get med refills, and will call the office back to schedule if needed.

## 2024-05-16 RX ORDER — ATORVASTATIN CALCIUM 40 MG/1
40 TABLET, FILM COATED ORAL NIGHTLY
Qty: 90 TABLET | Refills: 1 | Status: SHIPPED | OUTPATIENT
Start: 2024-05-16

## 2024-05-16 NOTE — TELEPHONE ENCOUNTER
"I reviewed last visit to Dr. Ayala in 2023.  His lipids in 6/23 were at goal.  AST was slightly above normal.   He called our office requesting refill of atorvostatin for HLD. I provided a refill.  Needs appointment for diabetes check before further refills of this medication.      Our nurse, Ms Cruz, attempted to schedule him for diabetes followup visit by phone call, but he declined to do so, until \"checking with the insurance company\" about how he might get his meds refilled without an office visit.  He said he would call for an appointment with new physician if necessary.  He is aware that future medication refills will depend upon having a visit with a provider.  "

## 2024-05-28 ENCOUNTER — OFFICE VISIT (OUTPATIENT)
Age: 74
End: 2024-05-28
Payer: MEDICARE

## 2024-05-28 VITALS
TEMPERATURE: 98.5 F | RESPIRATION RATE: 16 BRPM | SYSTOLIC BLOOD PRESSURE: 136 MMHG | HEIGHT: 71 IN | WEIGHT: 218.7 LBS | DIASTOLIC BLOOD PRESSURE: 86 MMHG | BODY MASS INDEX: 30.62 KG/M2

## 2024-05-28 DIAGNOSIS — E66.9 OBESITY, CLASS I, BMI 30-34.9: ICD-10-CM

## 2024-05-28 DIAGNOSIS — E78.2 MODERATE MIXED HYPERLIPIDEMIA NOT REQUIRING STATIN THERAPY: ICD-10-CM

## 2024-05-28 DIAGNOSIS — R03.0 WHITE COAT SYNDROME WITHOUT HYPERTENSION: ICD-10-CM

## 2024-05-28 DIAGNOSIS — E11.9 DIABETES MELLITUS TYPE 2 WITHOUT RETINOPATHY (HCC): Primary | ICD-10-CM

## 2024-05-28 PROBLEM — M25.569 KNEE PAIN: Status: ACTIVE | Noted: 2023-02-13

## 2024-05-28 PROBLEM — M00.061 STAPHYLOCOCCAL ARTHRITIS OF RIGHT KNEE (HCC): Status: ACTIVE | Noted: 2021-07-01

## 2024-05-28 PROBLEM — L82.1 OTHER SEBORRHEIC KERATOSIS: Status: ACTIVE | Noted: 2019-11-18

## 2024-05-28 PROBLEM — K46.9 ABDOMINAL HERNIA: Status: ACTIVE | Noted: 2023-02-13

## 2024-05-28 PROBLEM — D48.5 NEOPLASM OF UNCERTAIN BEHAVIOR OF SKIN: Status: ACTIVE | Noted: 2019-11-18

## 2024-05-28 PROBLEM — E43 SEVERE PROTEIN-CALORIE MALNUTRITION (HCC): Status: ACTIVE | Noted: 2021-06-15

## 2024-05-28 PROBLEM — D49.2 NEOPLASM OF UNSPECIFIED BEHAVIOR OF BONE, SOFT TISSUE, AND SKIN: Status: ACTIVE | Noted: 2019-11-18

## 2024-05-28 PROBLEM — K43.9 EPIGASTRIC HERNIA: Status: ACTIVE | Noted: 2024-01-29

## 2024-05-28 PROBLEM — K40.90 LEFT INGUINAL HERNIA: Status: ACTIVE | Noted: 2023-02-13

## 2024-05-28 PROBLEM — M17.11 ARTHRITIS OF KNEE, RIGHT: Status: ACTIVE | Noted: 2021-06-13

## 2024-05-28 PROBLEM — E11.69 TYPE 2 DIABETES MELLITUS WITH OBESITY (HCC): Status: ACTIVE | Noted: 2021-07-30

## 2024-05-28 PROBLEM — H52.203 ASTIGMATISM OF BOTH EYES WITH PRESBYOPIA: Status: ACTIVE | Noted: 2024-02-05

## 2024-05-28 PROBLEM — K43.9 HERNIA OF ABDOMINAL WALL: Chronic | Status: ACTIVE | Noted: 2023-04-03

## 2024-05-28 PROBLEM — L91.8 OTHER HYPERTROPHIC DISORDERS OF THE SKIN: Status: ACTIVE | Noted: 2019-11-18

## 2024-05-28 PROBLEM — M00.9 SEPTIC ARTHRITIS (HCC): Status: ACTIVE | Noted: 2021-08-06

## 2024-05-28 PROBLEM — D22.5 MELANOCYTIC NEVI OF TRUNK: Status: ACTIVE | Noted: 2019-11-18

## 2024-05-28 PROBLEM — M00.011 STAPHYLOCOCCAL ARTHRITIS OF RIGHT SHOULDER (HCC): Status: ACTIVE | Noted: 2021-08-26

## 2024-05-28 PROBLEM — H52.4 ASTIGMATISM OF BOTH EYES WITH PRESBYOPIA: Status: ACTIVE | Noted: 2024-02-05

## 2024-05-28 PROBLEM — L57.0 ACTINIC KERATOSIS: Status: ACTIVE | Noted: 2019-11-18

## 2024-05-28 PROBLEM — R21 RASH AND OTHER NONSPECIFIC SKIN ERUPTION: Status: ACTIVE | Noted: 2019-11-18

## 2024-05-28 PROBLEM — E66.811 OBESITY, CLASS I, BMI 30-34.9: Status: ACTIVE | Noted: 2022-10-10

## 2024-05-28 PROBLEM — Z22.321 MSSA (METHICILLIN-SUSCEPTIBLE STAPH AUREUS) CARRIER: Status: ACTIVE | Noted: 2023-02-13

## 2024-05-28 PROBLEM — E78.5 HLD (HYPERLIPIDEMIA): Status: ACTIVE | Noted: 2023-02-13

## 2024-05-28 PROBLEM — Z85.828 PERSONAL HISTORY OF OTHER MALIGNANT NEOPLASM OF SKIN: Status: ACTIVE | Noted: 2019-11-18

## 2024-05-28 PROBLEM — M17.9 DJD (DEGENERATIVE JOINT DISEASE) OF KNEE: Status: ACTIVE | Noted: 2022-10-10

## 2024-05-28 PROBLEM — H25.13 NUCLEAR SCLEROSIS OF BOTH EYES: Status: ACTIVE | Noted: 2024-02-05

## 2024-05-28 PROCEDURE — 99214 OFFICE O/P EST MOD 30 MIN: CPT | Performed by: FAMILY MEDICINE

## 2024-05-28 PROCEDURE — 1123F ACP DISCUSS/DSCN MKR DOCD: CPT | Performed by: FAMILY MEDICINE

## 2024-05-28 RX ORDER — PANTOPRAZOLE SODIUM 20 MG/1
20 TABLET, DELAYED RELEASE ORAL DAILY
COMMUNITY
Start: 2022-10-11

## 2024-05-28 RX ORDER — EMPAGLIFLOZIN 25 MG/1
25 TABLET, FILM COATED ORAL DAILY
COMMUNITY
End: 2024-05-28 | Stop reason: ALTCHOICE

## 2024-05-28 RX ORDER — GLIPIZIDE 5 MG/1
5 TABLET ORAL DAILY
Qty: 90 TABLET | Refills: 3 | Status: SHIPPED | OUTPATIENT
Start: 2024-05-28

## 2024-05-28 RX ORDER — ATORVASTATIN CALCIUM 40 MG/1
40 TABLET, FILM COATED ORAL NIGHTLY
Qty: 90 TABLET | Refills: 3 | Status: SHIPPED | OUTPATIENT
Start: 2024-05-28

## 2024-05-28 RX ORDER — ASPIRIN 81 MG/1
81 TABLET ORAL DAILY
COMMUNITY
Start: 2022-10-11

## 2024-05-28 RX ORDER — ATORVASTATIN CALCIUM 40 MG/1
40 TABLET, FILM COATED ORAL NIGHTLY
COMMUNITY
Start: 2021-03-27 | End: 2024-05-28 | Stop reason: SDUPTHER

## 2024-05-28 RX ORDER — GLIPIZIDE 5 MG/1
5 TABLET ORAL
COMMUNITY
End: 2024-05-28 | Stop reason: SDUPTHER

## 2024-05-28 SDOH — ECONOMIC STABILITY: HOUSING INSECURITY
IN THE LAST 12 MONTHS, WAS THERE A TIME WHEN YOU DID NOT HAVE A STEADY PLACE TO SLEEP OR SLEPT IN A SHELTER (INCLUDING NOW)?: NO

## 2024-05-28 SDOH — ECONOMIC STABILITY: FOOD INSECURITY: WITHIN THE PAST 12 MONTHS, THE FOOD YOU BOUGHT JUST DIDN'T LAST AND YOU DIDN'T HAVE MONEY TO GET MORE.: NEVER TRUE

## 2024-05-28 SDOH — ECONOMIC STABILITY: INCOME INSECURITY: HOW HARD IS IT FOR YOU TO PAY FOR THE VERY BASICS LIKE FOOD, HOUSING, MEDICAL CARE, AND HEATING?: NOT HARD AT ALL

## 2024-05-28 SDOH — ECONOMIC STABILITY: FOOD INSECURITY: WITHIN THE PAST 12 MONTHS, YOU WORRIED THAT YOUR FOOD WOULD RUN OUT BEFORE YOU GOT MONEY TO BUY MORE.: NEVER TRUE

## 2024-05-28 ASSESSMENT — PATIENT HEALTH QUESTIONNAIRE - PHQ9
2. FEELING DOWN, DEPRESSED OR HOPELESS: NOT AT ALL
SUM OF ALL RESPONSES TO PHQ QUESTIONS 1-9: 0
SUM OF ALL RESPONSES TO PHQ9 QUESTIONS 1 & 2: 0
1. LITTLE INTEREST OR PLEASURE IN DOING THINGS: NOT AT ALL
SUM OF ALL RESPONSES TO PHQ QUESTIONS 1-9: 0

## 2024-05-28 NOTE — PROGRESS NOTES
CC: Here for follow DM, HTN, Obesity.      Subjective  Nader Gurrola, 74 y.o. male presents today with:  Chief Complaint   Patient presents with    New Patient     Pt here to establish care with Dr Ceja  No concerns          Patient is here for follow up for diabetes and hypertension.  Does Report home blood sugars between 120 and 170.  Highest blood sugar recorded in the last 2 weeks is 200.  Denies any episodes of hypoglycemia.  Has been tolerating medications well.  No reported side effects to medications.  For blood pressure, no headaches, blurry vision, chest pain, dizziness or other new symptoms.  Has been tolerating blood pressure medications with appropriate compliance.    Recently had hernia surgery, paraumblical and inguinal hernia. Surgery went well.   Currently cl;eared to play tennis again.    HLD: on statin, tolerating well, no current reported side effects.     REVIEW OF SYSTEMS:   CONSTITUTIONAL: Denies: fever, chills, weakness, fatigue  EYES: Denies: Denies: blurry vision, photophobia  CARDIOVASCULAR: Denies: chest pain, edema, palpitations  RESPIRATORY: Denies: shortness of breath, wheezing  GI: Denies: abdominal pain, nausea, vomiting     History reviewed. No pertinent past medical history.  History reviewed. No pertinent surgical history.  Current Outpatient Medications   Medication Sig Dispense Refill    metFORMIN (GLUCOPHAGE) 1000 MG tablet Take 1 tablet by mouth 2 times daily (with meals)      glipiZIDE (GLUCOTROL) 5 MG tablet Take 1 tablet by mouth 2 times daily (before meals)      aspirin 81 MG EC tablet Take 1 tablet by mouth daily      atorvastatin (LIPITOR) 40 MG tablet Take 1 tablet by mouth nightly      pantoprazole (PROTONIX) 20 MG tablet Take 1 tablet by mouth daily      JARDIANCE 25 MG tablet Take 1 tablet by mouth daily       No current facility-administered medications for this visit.     PMH, Surgical Hx, Family Hx, and Social Hx reviewed and updated.    Health Maintenance

## 2024-06-11 ENCOUNTER — APPOINTMENT (OUTPATIENT)
Dept: DERMATOLOGY | Facility: CLINIC | Age: 74
End: 2024-06-11
Payer: MEDICARE

## 2024-06-12 DIAGNOSIS — E11.9 DIABETES MELLITUS TYPE 2 WITHOUT RETINOPATHY (HCC): ICD-10-CM

## 2024-06-12 DIAGNOSIS — E78.2 MODERATE MIXED HYPERLIPIDEMIA NOT REQUIRING STATIN THERAPY: ICD-10-CM

## 2024-06-12 LAB
ALBUMIN SERPL-MCNC: 4.6 G/DL (ref 3.4–5)
ALBUMIN/GLOB SERPL: 1.8 {RATIO} (ref 1.1–2.2)
ALP SERPL-CCNC: 98 U/L (ref 40–129)
ALT SERPL-CCNC: 31 U/L (ref 10–40)
ANION GAP SERPL CALCULATED.3IONS-SCNC: 16 MMOL/L (ref 3–16)
AST SERPL-CCNC: 19 U/L (ref 15–37)
BILIRUB SERPL-MCNC: 1.1 MG/DL (ref 0–1)
BUN SERPL-MCNC: 21 MG/DL (ref 7–20)
CALCIUM SERPL-MCNC: 10.1 MG/DL (ref 8.3–10.6)
CHLORIDE SERPL-SCNC: 101 MMOL/L (ref 99–110)
CHOLEST SERPL-MCNC: 128 MG/DL (ref 0–199)
CO2 SERPL-SCNC: 20 MMOL/L (ref 21–32)
CREAT SERPL-MCNC: 0.9 MG/DL (ref 0.8–1.3)
CREAT UR-MCNC: 64.2 MG/DL (ref 39–259)
GFR SERPLBLD CREATININE-BSD FMLA CKD-EPI: >90 ML/MIN/{1.73_M2}
GLUCOSE SERPL-MCNC: 187 MG/DL (ref 70–99)
HDLC SERPL-MCNC: 32 MG/DL (ref 40–60)
LDLC SERPL CALC-MCNC: ABNORMAL MG/DL
LDLC SERPL-MCNC: 56 MG/DL
MICROALBUMIN UR DL<=1MG/L-MCNC: 1.5 MG/DL
MICROALBUMIN/CREAT UR: 23.4 MG/G (ref 0–30)
POTASSIUM SERPL-SCNC: 4.1 MMOL/L (ref 3.5–5.1)
PROT SERPL-MCNC: 7.2 G/DL (ref 6.4–8.2)
SODIUM SERPL-SCNC: 137 MMOL/L (ref 136–145)
TRIGL SERPL-MCNC: 428 MG/DL (ref 0–150)
VLDLC SERPL CALC-MCNC: ABNORMAL MG/DL

## 2024-06-13 LAB
EST. AVERAGE GLUCOSE BLD GHB EST-MCNC: 182.9 MG/DL
HBA1C MFR BLD: 8 %

## 2024-06-20 ENCOUNTER — TELEPHONE (OUTPATIENT)
Dept: FAMILY MEDICINE CLINIC | Age: 74
End: 2024-06-20

## 2024-06-20 NOTE — TELEPHONE ENCOUNTER
Patient states Januvia 50 mg was sent to the pharmacy, he says this is something he took previously and he was expecting at Rx for Jardiance 25 mg.    Patient is asking if he can take the Januvia 50 mg and when it is time to refill get the Jardiance 25 mg.    Patient would like to be informed through my chart.    Please Advise.

## 2024-06-24 ENCOUNTER — APPOINTMENT (OUTPATIENT)
Dept: SURGERY | Facility: CLINIC | Age: 74
End: 2024-06-24
Payer: MEDICARE

## 2024-06-24 DIAGNOSIS — Z09 S/P EPIGASTRIC HERNIA REPAIR, FOLLOW-UP EXAM: ICD-10-CM

## 2024-06-24 DIAGNOSIS — Z87.19 H/O LEFT INGUINAL HERNIA REPAIR: Primary | ICD-10-CM

## 2024-06-24 DIAGNOSIS — Z98.890 H/O LEFT INGUINAL HERNIA REPAIR: Primary | ICD-10-CM

## 2024-06-24 PROCEDURE — 99024 POSTOP FOLLOW-UP VISIT: CPT | Performed by: SURGERY

## 2024-06-24 ASSESSMENT — ENCOUNTER SYMPTOMS
GASTROINTESTINAL NEGATIVE: 1
CONSTITUTIONAL NEGATIVE: 1

## 2024-06-24 NOTE — PROGRESS NOTES
Subjective   Patient ID: Jose Domingo is a 74 y.o. male who presents for Post-op Visit.  HPI  Mr. Domingo presents for post-op s/p L inguinal and NADINE epigastric hernia repair. He denies having any post-operative pain, his physical activity is at baseline. His appetite is normal. He is having normal bowel movements. He denies having any nausea, vomiting, constipation. He had a bout of post-operative surgical wound skin infection that has completely resolved, and he denies having any discharge or pain at the wound.     Review of Systems   Constitutional: Negative.    Gastrointestinal: Negative.        Objective   Physical Exam  Constitutional:       Appearance: Normal appearance. He is normal weight.   Abdominal:      General: Abdomen is flat. Bowel sounds are normal.      Palpations: Abdomen is soft.   Neurological:      Mental Status: He is alert.         Assessment/Plan   Problem List Items Addressed This Visit    None  Visit Diagnoses         Codes    H/O left inguinal hernia repair    -  Primary Z98.890, Z87.19    S/P epigastric hernia repair, follow-up exam     Z09        Patient can perform physical activity to tolerance, no limits.  Postoperative induration in his left inguinal canal markedly improved and no evidence of recurrent hernia.  He can give the office a call if he sees any signs of hernia recurrence. Otherwise, no follow up necessary.     This patient was seen and examined by Dr. Ohara.         Arnav Hdz 06/24/24 10:14 AM , PA-S2

## 2024-10-14 ENCOUNTER — APPOINTMENT (OUTPATIENT)
Dept: DERMATOLOGY | Facility: CLINIC | Age: 74
End: 2024-10-14
Payer: MEDICARE

## 2024-12-03 ENCOUNTER — TELEPHONE (OUTPATIENT)
Dept: CARE COORDINATION | Facility: CLINIC | Age: 74
End: 2024-12-03
Payer: MEDICARE

## 2024-12-03 ENCOUNTER — TELEPHONE (OUTPATIENT)
Age: 74
End: 2024-12-03

## 2024-12-03 NOTE — TELEPHONE ENCOUNTER
Patient is asking for lab work orders so he can complete prior to appt on 12/27/24.    Please send [employer] message to patient when orders are placed.

## 2024-12-03 NOTE — PROGRESS NOTES
Left message on patient's phone with these details.     Hello. My name is Samantha. I am the Patient Navigator with [facility] that works with [facility] office.     Patient is over due for Medicare wellness visit.     Left office number for patient to call and schedule FUV.        [facility]  [id number]  [facility] [address]  [place] [address]    Office Phone: [phone] Option 3  Patient Navigator: [phone]

## 2024-12-04 DIAGNOSIS — E11.69 TYPE 2 DIABETES MELLITUS WITH OBESITY (HCC): Primary | ICD-10-CM

## 2024-12-04 DIAGNOSIS — E66.9 TYPE 2 DIABETES MELLITUS WITH OBESITY (HCC): Primary | ICD-10-CM

## 2024-12-18 DIAGNOSIS — E11.69 TYPE 2 DIABETES MELLITUS WITH OBESITY (HCC): ICD-10-CM

## 2024-12-18 DIAGNOSIS — E66.9 TYPE 2 DIABETES MELLITUS WITH OBESITY (HCC): ICD-10-CM

## 2024-12-19 LAB
ALBUMIN SERPL-MCNC: 4.9 G/DL (ref 3.4–5)
ALBUMIN/GLOB SERPL: 2 {RATIO} (ref 1.1–2.2)
ALP SERPL-CCNC: 83 U/L (ref 40–129)
ALT SERPL-CCNC: 34 U/L (ref 10–40)
ANION GAP SERPL CALCULATED.3IONS-SCNC: 16 MMOL/L (ref 3–16)
AST SERPL-CCNC: 24 U/L (ref 15–37)
BILIRUB SERPL-MCNC: 1.4 MG/DL (ref 0–1)
BUN SERPL-MCNC: 16 MG/DL (ref 7–20)
CALCIUM SERPL-MCNC: 10.3 MG/DL (ref 8.3–10.6)
CHLORIDE SERPL-SCNC: 103 MMOL/L (ref 99–110)
CHOLEST SERPL-MCNC: 135 MG/DL (ref 0–199)
CO2 SERPL-SCNC: 21 MMOL/L (ref 21–32)
CREAT SERPL-MCNC: 0.8 MG/DL (ref 0.8–1.3)
EST. AVERAGE GLUCOSE BLD GHB EST-MCNC: 174.3 MG/DL
GFR SERPLBLD CREATININE-BSD FMLA CKD-EPI: >90 ML/MIN/{1.73_M2}
GLUCOSE SERPL-MCNC: 95 MG/DL (ref 70–99)
HBA1C MFR BLD: 7.7 %
HDLC SERPL-MCNC: 39 MG/DL (ref 40–60)
LDLC SERPL CALC-MCNC: 58 MG/DL
POTASSIUM SERPL-SCNC: 4 MMOL/L (ref 3.5–5.1)
PROT SERPL-MCNC: 7.4 G/DL (ref 6.4–8.2)
SODIUM SERPL-SCNC: 140 MMOL/L (ref 136–145)
TRIGL SERPL-MCNC: 190 MG/DL (ref 0–150)
VLDLC SERPL CALC-MCNC: 38 MG/DL

## 2024-12-24 SDOH — HEALTH STABILITY: PHYSICAL HEALTH: ON AVERAGE, HOW MANY DAYS PER WEEK DO YOU ENGAGE IN MODERATE TO STRENUOUS EXERCISE (LIKE A BRISK WALK)?: 2 DAYS

## 2024-12-24 SDOH — HEALTH STABILITY: PHYSICAL HEALTH: ON AVERAGE, HOW MANY MINUTES DO YOU ENGAGE IN EXERCISE AT THIS LEVEL?: 10 MIN

## 2024-12-24 ASSESSMENT — PATIENT HEALTH QUESTIONNAIRE - PHQ9
SUM OF ALL RESPONSES TO PHQ QUESTIONS 1-9: 0
2. FEELING DOWN, DEPRESSED OR HOPELESS: NOT AT ALL
SUM OF ALL RESPONSES TO PHQ QUESTIONS 1-9: 0
SUM OF ALL RESPONSES TO PHQ9 QUESTIONS 1 & 2: 0
1. LITTLE INTEREST OR PLEASURE IN DOING THINGS: NOT AT ALL

## 2024-12-24 ASSESSMENT — LIFESTYLE VARIABLES
HOW OFTEN DO YOU HAVE A DRINK CONTAINING ALCOHOL: 2-3 TIMES A WEEK
HOW OFTEN DO YOU HAVE A DRINK CONTAINING ALCOHOL: 4
HOW MANY STANDARD DRINKS CONTAINING ALCOHOL DO YOU HAVE ON A TYPICAL DAY: 1
HOW OFTEN DO YOU HAVE SIX OR MORE DRINKS ON ONE OCCASION: 1
HOW MANY STANDARD DRINKS CONTAINING ALCOHOL DO YOU HAVE ON A TYPICAL DAY: 1 OR 2

## 2024-12-27 ENCOUNTER — OFFICE VISIT (OUTPATIENT)
Age: 74
End: 2024-12-27

## 2024-12-27 VITALS
HEIGHT: 71 IN | WEIGHT: 221.2 LBS | BODY MASS INDEX: 30.97 KG/M2 | HEART RATE: 78 BPM | DIASTOLIC BLOOD PRESSURE: 82 MMHG | OXYGEN SATURATION: 94 % | TEMPERATURE: 98.4 F | SYSTOLIC BLOOD PRESSURE: 122 MMHG | RESPIRATION RATE: 16 BRPM

## 2024-12-27 DIAGNOSIS — E11.69 TYPE 2 DIABETES MELLITUS WITH OBESITY (HCC): ICD-10-CM

## 2024-12-27 DIAGNOSIS — Z00.00 MEDICARE ANNUAL WELLNESS VISIT, INITIAL: ICD-10-CM

## 2024-12-27 DIAGNOSIS — E78.5 HYPERLIPIDEMIA, UNSPECIFIED HYPERLIPIDEMIA TYPE: ICD-10-CM

## 2024-12-27 DIAGNOSIS — E66.9 TYPE 2 DIABETES MELLITUS WITH OBESITY (HCC): ICD-10-CM

## 2024-12-27 DIAGNOSIS — Z00.00 WELCOME TO MEDICARE PREVENTIVE VISIT: Primary | ICD-10-CM

## 2024-12-27 PROBLEM — K46.9 ABDOMINAL HERNIA: Status: RESOLVED | Noted: 2023-02-13 | Resolved: 2024-12-27

## 2024-12-27 PROBLEM — K43.9 HERNIA OF ABDOMINAL WALL: Chronic | Status: RESOLVED | Noted: 2023-04-03 | Resolved: 2024-12-27

## 2024-12-27 PROBLEM — E43 SEVERE PROTEIN-CALORIE MALNUTRITION (HCC): Status: RESOLVED | Noted: 2021-06-15 | Resolved: 2024-12-27

## 2024-12-27 RX ORDER — BLOOD-GLUCOSE METER
1 KIT MISCELLANEOUS DAILY
Qty: 1 KIT | Refills: 0 | Status: SHIPPED | OUTPATIENT
Start: 2024-12-27

## 2024-12-27 RX ORDER — LANCETS 30 GAUGE
1 EACH MISCELLANEOUS DAILY
Qty: 100 EACH | Refills: 5 | Status: SHIPPED | OUTPATIENT
Start: 2024-12-27

## 2024-12-27 RX ORDER — BLOOD PRESSURE TEST KIT
KIT MISCELLANEOUS
Qty: 100 EACH | Refills: 5 | Status: SHIPPED | OUTPATIENT
Start: 2024-12-27

## 2024-12-27 RX ORDER — GLUCOSAMINE HCL/CHONDROITIN SU 500-400 MG
CAPSULE ORAL
Qty: 100 STRIP | Refills: 5 | Status: SHIPPED | OUTPATIENT
Start: 2024-12-27

## 2024-12-27 ASSESSMENT — LIFESTYLE VARIABLES
HOW OFTEN DO YOU HAVE A DRINK CONTAINING ALCOHOL: 2-3 TIMES A WEEK
HOW MANY STANDARD DRINKS CONTAINING ALCOHOL DO YOU HAVE ON A TYPICAL DAY: 1 OR 2

## 2024-12-27 ASSESSMENT — PATIENT HEALTH QUESTIONNAIRE - PHQ9
SUM OF ALL RESPONSES TO PHQ QUESTIONS 1-9: 0
SUM OF ALL RESPONSES TO PHQ9 QUESTIONS 1 & 2: 0
SUM OF ALL RESPONSES TO PHQ QUESTIONS 1-9: 0
2. FEELING DOWN, DEPRESSED OR HOPELESS: NOT AT ALL
1. LITTLE INTEREST OR PLEASURE IN DOING THINGS: NOT AT ALL

## 2024-12-27 NOTE — ASSESSMENT & PLAN NOTE
Chronic, at goal (stable), continue current treatment plan    Orders:     DIABETES FOOT EXAM    External Referral to Ophthalmology    glucose monitoring kit; 1 kit by Does not apply route daily    blood glucose monitor strips; DX: diabetes mellitus. Use 1 time(s) daily - Ok to substitute per insurance    Lancets MISC; 1 each by Does not apply route daily    Alcohol Swabs PADS; DX: diabetes mellitus. Test 1 time(s) daily - Ok to substitute per insurance (1 each = 1 box)

## 2024-12-27 NOTE — PATIENT INSTRUCTIONS
low-fat dairy products. This can help you get the right balance of nutrients, including vitamins and minerals. Small changes add up over time. You can start by adding one healthy food to your meals each day.    Try to make half your plate fruits and vegetables, one-fourth whole grains, and one-fourth lean proteins. Try including dairy with your meals.   Eat more fruits and vegetables. Try to have them with most meals and snacks.   Foods for healthy eating        Fruits   These can be fresh, frozen, canned, or dried.  Try to choose whole fruit rather than fruit juice.  Eat a variety of colors.        Vegetables   These can be fresh, frozen, canned, or dried.  Beans, peas, and lentils count too.        Whole grains   Choose whole-grain breads, cereals, and noodles.  Try brown rice.        Lean proteins   These can include lean meat, poultry, fish, and eggs.  You can also have tofu, beans, peas, lentils, nuts, and seeds.        Dairy   Try milk, yogurt, and cheese.  Choose low-fat or fat-free when you can.  If you need to, use lactose-free milk or fortified plant-based milk products, such as soy milk.        Water   Drink water when you're thirsty.  Limit sugar-sweetened drinks, including soda, fruit drinks, and sports drinks.  Where can you learn more?  Go to https://www.[employer].net/patientEd and enter T756 to learn more about \"Eating Healthy Foods: Care Instructions.\"  Current as of: September 20, 2023  Content Version: 14.2  © 2024 [employer].   Care instructions adapted under license by [employer]. If you have questions about a medical condition or this instruction, always ask your healthcare professional. [employer] disclaims any warranty or liability for your use of this information.           Starting a Weight Loss Plan: Care Instructions  Overview     It can be a challenge to lose weight. But your doctor can help you make a weight-loss plan that meets your needs.  You don't have  Unknown if ever smoked

## 2024-12-27 NOTE — PROGRESS NOTES
No    Interventions:  Patient advised to follow up in the office for further evaluation and treatment     Advanced Directives:  Do you have a Living Will?: (!) No (working on getting a living will)    Intervention:  Discussed with daughter and is aware of patient requests    Advance Care Planning   Discussed the patient’s choices for care and treatment preferences in case of a health event that adversely affects decision-making abilities or is life-limiting. Recommended the patient document care preferences in state-specific advance directives. Also reviewed the process of designating a trusted capable adult as an Agent (or Health Care Power of ) to make health care decisions for the patient if the patient becomes unable due to incapacity. Patient was asked to complete advance directive forms, if not already done, and to provide a dated, signed and witnessed (or notarized) copy, per the forms' requirements, to the practice office.    Time spent (minutes): <16 minutes (Non-Billable)                Objective   Vitals:    12/27/24 1614   BP: 122/82   Site: Left Upper Arm   Position: Sitting   Cuff Size: Medium Adult   Pulse: 78   Resp: 16   Temp: 98.4 °F (36.9 °C)   SpO2: 94%   Weight: 100.3 kg (221 lb 3.2 oz)   Height: 1.803 m (5' 11\")      Body mass index is 30.85 kg/m².      General Appearance:    Alert, cooperative, no distress, appears stated age  Head:    Normocephalic, without obvious abnormality, atraumatic  Eyes:    PERRL, conjunctiva/corneas clear, EOM's intact.  Nose:   Nares normal, septum midline, mucosa normal, no drainage  or sinus tenderness  Neck:   Supple, symmetrical, trachea midline, No adenopathy;  Lungs:     Clear to auscultation bilaterally, respirations unlabored   Heart:    Regular rate and rhythm, S1 and S2 normal, no murmur, rub   or gallop      LL:                   No edema   Foot exam: no apparent lesions, dry skin, discoloration of left second toe, decreased sensations  Hands:

## 2025-06-06 DIAGNOSIS — E78.2 MODERATE MIXED HYPERLIPIDEMIA NOT REQUIRING STATIN THERAPY: ICD-10-CM

## 2025-06-06 DIAGNOSIS — E11.9 DIABETES MELLITUS TYPE 2 WITHOUT RETINOPATHY (HCC): ICD-10-CM

## 2025-06-06 NOTE — TELEPHONE ENCOUNTER
Comments:     Last Office Visit (last PCP visit):   12/27/2024    Next Visit Date:  Future Appointments   Date Time Provider Department Center   12/30/2025  8:30 AM Edgardo Ceja MD [facility] DEP       **If hasn't been seen in over a year OR hasn't followed up according to last diabetes/ADHD visit, make appointment for patient before sending refill to provider.    Rx requested:  Requested Prescriptions     Pending Prescriptions Disp Refills    metFORMIN (GLUCOPHAGE) 1000 MG tablet [Pharmacy Med Name: METFORMIN HCL 1,000 MG TABLET] 180 tablet 3     Sig: TAKE 1 TABLET BY MOUTH TWICE A DAY WITH MEALS    glipiZIDE (GLUCOTROL) 5 MG tablet [Pharmacy Med Name: GLIPIZIDE 5 MG TABLET] 90 tablet 3     Sig: TAKE 1 TABLET BY MOUTH EVERY DAY    atorvastatin (LIPITOR) 40 MG tablet [Pharmacy Med Name: ATORVASTATIN 40 MG TABLET] 90 tablet 3     Sig: TAKE 1 TABLET BY MOUTH EVERY DAY AT NIGHT

## 2025-06-09 DIAGNOSIS — E11.9 DIABETES MELLITUS TYPE 2 WITHOUT RETINOPATHY (HCC): ICD-10-CM

## 2025-06-09 RX ORDER — GLIPIZIDE 5 MG/1
5 TABLET ORAL DAILY
Qty: 90 TABLET | Refills: 3 | Status: SHIPPED | OUTPATIENT
Start: 2025-06-09

## 2025-06-09 RX ORDER — ATORVASTATIN CALCIUM 40 MG/1
40 TABLET, FILM COATED ORAL NIGHTLY
Qty: 90 TABLET | Refills: 3 | Status: SHIPPED | OUTPATIENT
Start: 2025-06-09

## 2025-06-12 DIAGNOSIS — Z12.11 SCREENING FOR COLORECTAL CANCER: ICD-10-CM

## 2025-06-12 DIAGNOSIS — Z12.12 SCREENING FOR COLORECTAL CANCER: ICD-10-CM

## 2025-07-07 ENCOUNTER — APPOINTMENT (OUTPATIENT)
Dept: OPHTHALMOLOGY | Facility: CLINIC | Age: 75
End: 2025-07-07
Payer: COMMERCIAL

## 2025-07-07 DIAGNOSIS — H52.203 HYPEROPIA OF BOTH EYES WITH ASTIGMATISM AND PRESBYOPIA: ICD-10-CM

## 2025-07-07 DIAGNOSIS — E11.3292 MILD NONPROLIFERATIVE DIABETIC RETINOPATHY OF LEFT EYE ASSOCIATED WITH TYPE 2 DIABETES MELLITUS, MACULAR EDEMA PRESENCE UNSPECIFIED: Primary | ICD-10-CM

## 2025-07-07 DIAGNOSIS — H52.4 HYPEROPIA OF BOTH EYES WITH ASTIGMATISM AND PRESBYOPIA: ICD-10-CM

## 2025-07-07 DIAGNOSIS — H52.03 HYPEROPIA OF BOTH EYES WITH ASTIGMATISM AND PRESBYOPIA: ICD-10-CM

## 2025-07-07 PROCEDURE — 92014 COMPRE OPH EXAM EST PT 1/>: CPT | Performed by: OPTOMETRIST

## 2025-07-07 PROCEDURE — 92015 DETERMINE REFRACTIVE STATE: CPT | Performed by: OPTOMETRIST

## 2025-07-07 ASSESSMENT — REFRACTION_MANIFEST
OS_AXIS: 075
OD_SPHERE: +1.75
OD_CYLINDER: -2.25
OD_CYLINDER: -2.25
OS_ADD: +2.50
OS_CYLINDER: -2.00
OD_AXIS: 095
OD_ADD: +2.50
OS_CYLINDER: -2.00
OD_SPHERE: +4.25
OD_AXIS: 095
OS_AXIS: 075
OS_SPHERE: +0.25
OS_SPHERE: +2.75

## 2025-07-07 ASSESSMENT — CONF VISUAL FIELD
OS_SUPERIOR_NASAL_RESTRICTION: 0
OD_SUPERIOR_NASAL_RESTRICTION: 0
METHOD: COUNTING FINGERS
OD_INFERIOR_TEMPORAL_RESTRICTION: 0
OD_NORMAL: 1
OS_NORMAL: 1
OD_INFERIOR_NASAL_RESTRICTION: 0
OS_INFERIOR_NASAL_RESTRICTION: 0
OS_SUPERIOR_TEMPORAL_RESTRICTION: 0
OD_SUPERIOR_TEMPORAL_RESTRICTION: 0
OS_INFERIOR_TEMPORAL_RESTRICTION: 0

## 2025-07-07 ASSESSMENT — VISUAL ACUITY
OS_SC+: +2
METHOD: SNELLEN - LINEAR
OS_SC: 20/50
OD_SC: 20/40
OD_SC+: +2

## 2025-07-07 ASSESSMENT — CUP TO DISC RATIO
OD_RATIO: 0.2
OS_RATIO: 0.2

## 2025-07-07 ASSESSMENT — REFRACTION_WEARINGRX
OS_CYLINDER: -2.00
OS_AXIS: 082
OD_SPHERE: +1.50
OD_ADD: +2.50
OD_CYLINDER: -2.00
OD_AXIS: 095
OS_SPHERE: +0.75
OS_ADD: +2.50

## 2025-07-07 ASSESSMENT — ENCOUNTER SYMPTOMS
GASTROINTESTINAL NEGATIVE: 0
NEUROLOGICAL NEGATIVE: 0
ENDOCRINE NEGATIVE: 1
CARDIOVASCULAR NEGATIVE: 1
CONSTITUTIONAL NEGATIVE: 0
MUSCULOSKELETAL NEGATIVE: 0
ALLERGIC/IMMUNOLOGIC NEGATIVE: 0
PSYCHIATRIC NEGATIVE: 0
RESPIRATORY NEGATIVE: 0
HEMATOLOGIC/LYMPHATIC NEGATIVE: 0
EYES NEGATIVE: 0

## 2025-07-07 ASSESSMENT — EXTERNAL EXAM - RIGHT EYE: OD_EXAM: NORMAL

## 2025-07-07 ASSESSMENT — SLIT LAMP EXAM - LIDS
COMMENTS: NORMAL
COMMENTS: NORMAL

## 2025-07-07 ASSESSMENT — EXTERNAL EXAM - LEFT EYE: OS_EXAM: NORMAL

## 2025-07-07 ASSESSMENT — TONOMETRY
IOP_METHOD: GOLDMANN APPLANATION
OS_IOP_MMHG: 17
OD_IOP_MMHG: 16

## 2025-07-07 NOTE — PROGRESS NOTES
Diabetes mellitus (DM) Dx 5 years.  A1C 7.7 6 months ago. MA vs dot heme next to center of macula.     VA corrects to 20/20-3 OD and 20/20 OS.  A spectacle prescription was dispensed to be used as needed. One for reading as well.    Nuclear sclerosis OU.    Referred to retina service for DR evaluation.     1 year [facility] full exam.     The patient was asked to return to our clinic in one year or sooner if ocular or vision changes occur.

## 2025-07-11 NOTE — TELEPHONE ENCOUNTER
Comments:     Last Office Visit (last PCP visit):   12/27/2024    Next Visit Date:  Future Appointments   Date Time Provider Department Center   12/30/2025  8:30 AM Edgardo Ceja MD [facility] DEP       **If hasn't been seen in over a year OR hasn't followed up according to last diabetes/ADHD visit, make appointment for patient before sending refill to provider.    Rx requested:  Requested Prescriptions     Pending Prescriptions Disp Refills    JARDIANCE 25 MG tablet [Pharmacy Med Name: JARDIANCE 25 MG TABLET] 90 tablet 3     Sig: TAKE 1 TABLET BY MOUTH EVERY DAY

## 2025-07-14 RX ORDER — EMPAGLIFLOZIN 25 MG/1
25 TABLET, FILM COATED ORAL DAILY
Qty: 90 TABLET | Refills: 3 | Status: SHIPPED | OUTPATIENT
Start: 2025-07-14

## 2025-08-21 ENCOUNTER — OFFICE VISIT (OUTPATIENT)
Dept: OPHTHALMOLOGY | Facility: CLINIC | Age: 75
End: 2025-08-21
Payer: COMMERCIAL

## 2025-08-21 ENCOUNTER — APPOINTMENT (OUTPATIENT)
Dept: OPHTHALMOLOGY | Facility: CLINIC | Age: 75
End: 2025-08-21
Payer: COMMERCIAL

## 2025-08-21 DIAGNOSIS — E11.3292 MILD NONPROLIFERATIVE DIABETIC RETINOPATHY OF LEFT EYE ASSOCIATED WITH TYPE 2 DIABETES MELLITUS, MACULAR EDEMA PRESENCE UNSPECIFIED: Primary | ICD-10-CM

## 2025-08-21 DIAGNOSIS — H43.822 VITREOMACULAR TRACTION SYNDROME OF LEFT EYE: ICD-10-CM

## 2025-08-21 DIAGNOSIS — E11.9 DIABETES MELLITUS TYPE 2 WITHOUT RETINOPATHY (MULTI): ICD-10-CM

## 2025-08-21 PROCEDURE — 92134 CPTRZ OPH DX IMG PST SGM RTA: CPT | Performed by: OPHTHALMOLOGY

## 2025-08-21 PROCEDURE — 99213 OFFICE O/P EST LOW 20 MIN: CPT | Performed by: OPHTHALMOLOGY

## 2025-08-21 ASSESSMENT — EXTERNAL EXAM - LEFT EYE: OS_EXAM: NORMAL

## 2025-08-21 ASSESSMENT — CUP TO DISC RATIO
OS_RATIO: 0.2
OD_RATIO: 0.2

## 2025-08-21 ASSESSMENT — ENCOUNTER SYMPTOMS
GASTROINTESTINAL NEGATIVE: 0
ENDOCRINE NEGATIVE: 0
CARDIOVASCULAR NEGATIVE: 0
NEUROLOGICAL NEGATIVE: 0
ALLERGIC/IMMUNOLOGIC NEGATIVE: 0
PSYCHIATRIC NEGATIVE: 0
RESPIRATORY NEGATIVE: 0
CONSTITUTIONAL NEGATIVE: 0
HEMATOLOGIC/LYMPHATIC NEGATIVE: 0
EYES NEGATIVE: 1
MUSCULOSKELETAL NEGATIVE: 0

## 2025-08-21 ASSESSMENT — CONF VISUAL FIELD
OD_INFERIOR_NASAL_RESTRICTION: 0
OS_SUPERIOR_NASAL_RESTRICTION: 0
OD_NORMAL: 1
OS_INFERIOR_NASAL_RESTRICTION: 0
OD_INFERIOR_TEMPORAL_RESTRICTION: 0
OS_SUPERIOR_TEMPORAL_RESTRICTION: 0
OD_SUPERIOR_NASAL_RESTRICTION: 0
OD_SUPERIOR_TEMPORAL_RESTRICTION: 0
OS_INFERIOR_TEMPORAL_RESTRICTION: 0
OS_NORMAL: 1

## 2025-08-21 ASSESSMENT — VISUAL ACUITY
OS_SC: 20/50+1
METHOD: SNELLEN - LINEAR
OD_SC: 20/40-2

## 2025-08-21 ASSESSMENT — TONOMETRY
OS_IOP_MMHG: 17
OD_IOP_MMHG: 16
IOP_METHOD: GOLDMANN APPLANATION

## 2025-08-21 ASSESSMENT — SLIT LAMP EXAM - LIDS
COMMENTS: NORMAL
COMMENTS: NORMAL

## 2025-08-21 ASSESSMENT — EXTERNAL EXAM - RIGHT EYE: OD_EXAM: NORMAL

## 2025-08-28 ENCOUNTER — APPOINTMENT (OUTPATIENT)
Dept: OPHTHALMOLOGY | Facility: CLINIC | Age: 75
End: 2025-08-28
Payer: COMMERCIAL

## 2025-09-22 ENCOUNTER — APPOINTMENT (OUTPATIENT)
Dept: OPHTHALMOLOGY | Age: 75
End: 2025-09-22
Payer: COMMERCIAL

## 2026-07-13 ENCOUNTER — APPOINTMENT (OUTPATIENT)
Dept: OPHTHALMOLOGY | Facility: CLINIC | Age: 76
End: 2026-07-13
Payer: COMMERCIAL

## (undated) DEVICE — SCOPE WARMER, LAPAROSCOPE, BAG ONLY, LF

## (undated) DEVICE — CUP, SOLUTION

## (undated) DEVICE — PAD, GROUNDING, ELECTROSURGICAL, W/9 FT CABLE, POLYHESIVE II, ADULT, LF

## (undated) DEVICE — SEAL, SCOPE WARMER DISPOSABLE

## (undated) DEVICE — COVER, CART, 45 X 27 X 48 IN, CLEAR

## (undated) DEVICE — STAPLER, ENDOSCOPIC, INTERNAL, HELICAL 30, PROTACK, 5 MM, DISPOSABLE, TITANIUM

## (undated) DEVICE — REST, HEAD, BAGEL, 9 IN

## (undated) DEVICE — TOWEL, SURGICAL, NEURO, O/R, 16 X 26, BLUE, STERILE

## (undated) DEVICE — CAUTERY, PENCIL, PUSH BUTTON, SMOKE EVAC, 70MM

## (undated) DEVICE — DRESSING, TRANSPARENT, TEGADERM, 2-3/8 X 2-3/4 IN

## (undated) DEVICE — SUTURE, VICRYL, 3-0, 27 IN, SH, VIOLET

## (undated) DEVICE — Device

## (undated) DEVICE — APPLICATOR, CHLORAPREP, W/ORANGE TINT, 26ML

## (undated) DEVICE — CLIP, ENDO APPLIER LIGAMAX 5MM

## (undated) DEVICE — TROCAR SYSTEM, BALLOON, KII GELPORT, 12 X 100MM

## (undated) DEVICE — NEEDLE, HYPODERMIC, MONOJECT, TRI-BEVELED, ANTI-CORING, 25 G X 1.25 IN, LUER LOCK HUB, RED

## (undated) DEVICE — SUTURE, VICRYL, 4-0, 18 IN, PS2, UNDYED

## (undated) DEVICE — CATHETER TRAY, SURESTEP, 16FR, URINE METER W/STATLOCK

## (undated) DEVICE — DRAPE, SHEET, ENDOSCOPY, GENERAL, FENESTRATED, ARMBOARD COVER, 98 X 123.5 IN, DISPOSABLE, LF, STERILE

## (undated) DEVICE — TUBE SET, PNEUMOCLEAR, SMOKE EVACU, HIGH-FLOW

## (undated) DEVICE — SUTURE, VICRYL, 0, 27 IN, UR-6, VIOLET

## (undated) DEVICE — SUTURE, VICRYL, 3-0, 18 IN, UNDYED

## (undated) DEVICE — ADHESIVE, SKIN, MASTISOL, 2/3 CC VIAL

## (undated) DEVICE — MANIFOLD, 4 PORT NEPTUNE STANDARD

## (undated) DEVICE — ADHESIVE, SKIN, LIQUIBAND EXCEED

## (undated) DEVICE — DRESSING, GAUZE, SPONGE, VERSALON, 4 PLY, 2 X 2 IN, STERILE

## (undated) DEVICE — STRIP, SKIN CLOSURE, STERI STRIP, REINFORCED, 0.5 X 4 IN

## (undated) DEVICE — SUTURE, VICRYL, 4-0, 18 IN, UNDYED BR PS-2

## (undated) DEVICE — GOWN, SURGICAL, SMARTGOWN, XLARGE, STERILE